# Patient Record
Sex: MALE | Race: WHITE | NOT HISPANIC OR LATINO | Employment: FULL TIME | URBAN - METROPOLITAN AREA
[De-identification: names, ages, dates, MRNs, and addresses within clinical notes are randomized per-mention and may not be internally consistent; named-entity substitution may affect disease eponyms.]

---

## 2024-10-16 ENCOUNTER — HOSPITAL ENCOUNTER (EMERGENCY)
Facility: HOSPITAL | Age: 54
Discharge: HOME | End: 2024-10-16
Attending: EMERGENCY MEDICINE
Payer: COMMERCIAL

## 2024-10-16 VITALS
HEIGHT: 70 IN | DIASTOLIC BLOOD PRESSURE: 83 MMHG | WEIGHT: 171.38 LBS | HEART RATE: 87 BPM | SYSTOLIC BLOOD PRESSURE: 134 MMHG | BODY MASS INDEX: 24.54 KG/M2 | OXYGEN SATURATION: 98 % | RESPIRATION RATE: 14 BRPM | TEMPERATURE: 98 F

## 2024-10-16 DIAGNOSIS — R07.89 ATYPICAL CHEST PAIN: ICD-10-CM

## 2024-10-16 DIAGNOSIS — R41.82 ALTERED MENTAL STATUS: ICD-10-CM

## 2024-10-16 DIAGNOSIS — R03.0 ELEVATED BLOOD PRESSURE READING: Primary | ICD-10-CM

## 2024-10-16 LAB
ALBUMIN SERPL BCP-MCNC: 4.4 G/DL (ref 3.5–5.2)
ALP SERPL-CCNC: 42 U/L (ref 55–135)
ALT SERPL W/O P-5'-P-CCNC: 27 U/L (ref 10–44)
AMPHET+METHAMPHET UR QL: NEGATIVE
ANION GAP SERPL CALC-SCNC: 11 MMOL/L (ref 8–16)
AST SERPL-CCNC: 28 U/L (ref 10–40)
BARBITURATES UR QL SCN>200 NG/ML: NEGATIVE
BASOPHILS # BLD AUTO: 0.04 K/UL (ref 0–0.2)
BASOPHILS NFR BLD: 1 % (ref 0–1.9)
BENZODIAZ UR QL SCN>200 NG/ML: NEGATIVE
BILIRUB SERPL-MCNC: 1.4 MG/DL (ref 0.1–1)
BUN SERPL-MCNC: 8 MG/DL (ref 6–20)
BZE UR QL SCN: NEGATIVE
CALCIUM SERPL-MCNC: 9.8 MG/DL (ref 8.7–10.5)
CANNABINOIDS UR QL SCN: ABNORMAL
CHLORIDE SERPL-SCNC: 104 MMOL/L (ref 95–110)
CO2 SERPL-SCNC: 23 MMOL/L (ref 23–29)
CREAT SERPL-MCNC: 1.4 MG/DL (ref 0.5–1.4)
CREAT UR-MCNC: 281.3 MG/DL (ref 23–375)
DIFFERENTIAL METHOD BLD: ABNORMAL
EOSINOPHIL # BLD AUTO: 0.1 K/UL (ref 0–0.5)
EOSINOPHIL NFR BLD: 1.2 % (ref 0–8)
ERYTHROCYTE [DISTWIDTH] IN BLOOD BY AUTOMATED COUNT: 11.9 % (ref 11.5–14.5)
EST. GFR  (NO RACE VARIABLE): >60 ML/MIN/1.73 M^2
GLUCOSE SERPL-MCNC: 162 MG/DL (ref 70–110)
HCT VFR BLD AUTO: 48.4 % (ref 40–54)
HCV AB SERPL QL IA: NEGATIVE
HEP C VIRUS HOLD SPECIMEN: NORMAL
HGB BLD-MCNC: 16.6 G/DL (ref 14–18)
HIV 1+2 AB+HIV1 P24 AG SERPL QL IA: NEGATIVE
IMM GRANULOCYTES # BLD AUTO: 0.02 K/UL (ref 0–0.04)
IMM GRANULOCYTES NFR BLD AUTO: 0.5 % (ref 0–0.5)
LYMPHOCYTES # BLD AUTO: 0.7 K/UL (ref 1–4.8)
LYMPHOCYTES NFR BLD: 17.3 % (ref 18–48)
MAGNESIUM SERPL-MCNC: 2 MG/DL (ref 1.6–2.6)
MCH RBC QN AUTO: 31.7 PG (ref 27–31)
MCHC RBC AUTO-ENTMCNC: 34.3 G/DL (ref 32–36)
MCV RBC AUTO: 93 FL (ref 82–98)
METHADONE UR QL SCN>300 NG/ML: NEGATIVE
MONOCYTES # BLD AUTO: 0.2 K/UL (ref 0.3–1)
MONOCYTES NFR BLD: 5.7 % (ref 4–15)
NEUTROPHILS # BLD AUTO: 3 K/UL (ref 1.8–7.7)
NEUTROPHILS NFR BLD: 74.3 % (ref 38–73)
NRBC BLD-RTO: 0 /100 WBC
OPIATES UR QL SCN: NEGATIVE
PCP UR QL SCN>25 NG/ML: NEGATIVE
PLATELET # BLD AUTO: 224 K/UL (ref 150–450)
PMV BLD AUTO: 9.8 FL (ref 9.2–12.9)
POCT GLUCOSE: 165 MG/DL (ref 70–110)
POTASSIUM SERPL-SCNC: 4.9 MMOL/L (ref 3.5–5.1)
PROCALCITONIN SERPL IA-MCNC: 0.02 NG/ML
PROT SERPL-MCNC: 7.7 G/DL (ref 6–8.4)
RBC # BLD AUTO: 5.23 M/UL (ref 4.6–6.2)
SODIUM SERPL-SCNC: 138 MMOL/L (ref 136–145)
TOXICOLOGY INFORMATION: ABNORMAL
TROPONIN I SERPL DL<=0.01 NG/ML-MCNC: <0.006 NG/ML (ref 0–0.03)
TSH SERPL DL<=0.005 MIU/L-ACNC: 0.52 UIU/ML (ref 0.4–4)
WBC # BLD AUTO: 4.05 K/UL (ref 3.9–12.7)

## 2024-10-16 PROCEDURE — 86803 HEPATITIS C AB TEST: CPT | Performed by: EMERGENCY MEDICINE

## 2024-10-16 PROCEDURE — 84443 ASSAY THYROID STIM HORMONE: CPT | Performed by: EMERGENCY MEDICINE

## 2024-10-16 PROCEDURE — 84484 ASSAY OF TROPONIN QUANT: CPT | Performed by: EMERGENCY MEDICINE

## 2024-10-16 PROCEDURE — 83735 ASSAY OF MAGNESIUM: CPT | Performed by: EMERGENCY MEDICINE

## 2024-10-16 PROCEDURE — 93010 ELECTROCARDIOGRAM REPORT: CPT | Mod: ,,, | Performed by: INTERNAL MEDICINE

## 2024-10-16 PROCEDURE — 85025 COMPLETE CBC W/AUTO DIFF WBC: CPT | Performed by: EMERGENCY MEDICINE

## 2024-10-16 PROCEDURE — 84145 PROCALCITONIN (PCT): CPT | Performed by: EMERGENCY MEDICINE

## 2024-10-16 PROCEDURE — 87389 HIV-1 AG W/HIV-1&-2 AB AG IA: CPT | Performed by: EMERGENCY MEDICINE

## 2024-10-16 PROCEDURE — 80053 COMPREHEN METABOLIC PANEL: CPT | Performed by: EMERGENCY MEDICINE

## 2024-10-16 PROCEDURE — 99285 EMERGENCY DEPT VISIT HI MDM: CPT | Mod: 25

## 2024-10-16 PROCEDURE — 80307 DRUG TEST PRSMV CHEM ANLYZR: CPT | Performed by: EMERGENCY MEDICINE

## 2024-10-16 PROCEDURE — 25500020 PHARM REV CODE 255: Performed by: EMERGENCY MEDICINE

## 2024-10-16 PROCEDURE — 82962 GLUCOSE BLOOD TEST: CPT

## 2024-10-16 PROCEDURE — 93005 ELECTROCARDIOGRAM TRACING: CPT

## 2024-10-16 RX ADMIN — IOHEXOL 100 ML: 350 INJECTION, SOLUTION INTRAVENOUS at 02:10

## 2024-10-16 NOTE — ED PROVIDER NOTES
"Emergency Medicine Provider Note - 10/16/2024       SCRIBE NOTE: I, Tony Salazar, am scribing for, and in the presence of Margi Aponte DO, FACEP     History     Chief Complaint   Patient presents with    Fatigue     Patient presents to ED with confusion, patient states he is having what feels like electrical shocks in his head, weakness, lethargy. Patient also states his vision is blurred, facial tingling. Patient states he woke up with symptoms. LKW 0000. No facial droop noted.        Allergies:  Review of patient's allergies indicates:  No Known Allergies    History of Present Illness   HPI    10/16/2024, 11:44 AM  The history is provided by the patient and old chart.    Tarik Mary is a 54 y.o. male presenting to the ED for' fatigue' episode which onset at 1:00 AM this morning.  Patient has history traumatic brain injury (1994), orbital facial fracture.  No hx of seizure disorder.  Pt describes sensation of  a brief "electric shock" to head.  The electric shock last a 1 or 2 seconds.  It is not associated with any severe headache, loss consciousness, nausea/vomiting, neck pain, loss posture, bowel or bladder incontinence, numbness or weakness to 1 side, or slurred speech.  He reports feeling fatigued after the episode. Pt reports that these 'episodes' have occurred for a decade.  He has seen multiple specialists and has not found an explanation.  A day before he has one of these 'episodes' he has decreased appetite.  He can sometimes sense it is about to happen because he feels a generalized weakness of "loss of control". Today, he figured that maybe if he comes into the emergency department closer to onset of the episode, diagnostic testing might be able to find something.  States that the current episode occurred at at 1:00 a.m. this morning. It was not anything unusual or different compared to his baseline.  He did not consider it more severe or lasting longer.  Patient denies any " preceding chest pain, chest pressure, shortness of breath, difficulty breathing, palpitations, blood in stool, black tarry stool, severe back pain, or abdominal pain prior to these episodes.  Patient does note thing chest discomfort that happened earlier this week.  It is not associated with any nausea, vomiting, diaphoresis.  Did not radiate.  Was brief.  Not associated with any calf pain, calf swelling.  No known history of coronary artery disease.  Patient denies known hyperlipidemia or diabetes..  Patient does smoke marijuana, last time he smoked marijuana was a proximally 1 week prior to the episode.  Patient also does consume one 16 oz beer/ day at dinner.  Patient denies any tactile paresthesias, tremors, or other symptoms to suggest alcohol withdrawal. Old records : In  August 2014, he saw neurology at [facility] and had CT and EEG that were both reported as normal.  Patient saw Dr. Menon (neurology) on January 27, 2016.   He recommended MRI and EEG. There is no record of patient completing these tests.    Arrival mode: Private Vehicle     PCP: No, Primary Doctor     Past Medical History:  Traumatic brain injury    Past Surgical History:  Maxillofacial surgery     Family History:  No family history on file.    Social History:  Alcohol: one 16 oz beer per day with dinner.  Positive THC use      I have reviewed the Past Medical History, Past Surgical History, Family History and Social History as documented above.      Review of Systems   Review of Systems   Constitutional:  Positive for appetite change and fatigue. Negative for fever.   Eyes:  Positive for visual disturbance ((+) Blurred vision, (-) loss of vision).   Respiratory:  Negative for chest tightness.    Cardiovascular:  Negative for chest pain and palpitations.   Gastrointestinal:  Positive for nausea. Negative for abdominal pain and vomiting.   Genitourinary:         (-) incontinence (bladder or bowel)   Musculoskeletal:  Negative for  neck pain.   Neurological:  Positive for weakness (Generalized), light-headedness and numbness ((+) Perioral,NOT unilateral). Negative for dizziness, syncope, facial asymmetry, speech difficulty and headaches.   All other systems reviewed and are negative.         Physical Exam     Initial Vitals [10/16/24 1047]   BP Pulse Resp Temp SpO2   (!) 157/91 90 18 97.8 °F (36.6 °C) 99 %      MAP       --          Physical Exam    Nursing Notes and Vital Signs Reviewed.  Constitutional: Patient is in no acute distress. Well-developed and well-nourished.  Head: Atraumatic. Normocephalic.  Eyes: PERRL. EOM intact. Conjunctivae are not pale. No scleral icterus.  ENT: Mucous membranes are moist. Oropharynx is clear and symmetric.  TMs pearly gray bilaterally.    Neck: Supple. Full ROM. No lymphadenopathy.  No meningismus.  Cardiovascular: Regular rate. Regular rhythm. No murmurs, rubs, or gallops. Distal pulses are 2+ and symmetric.  Pulmonary/Chest: No respiratory distress. Clear to auscultation bilaterally. No wheezing or rales.  Abdominal: Soft and non-distended.  There is no tenderness.  No rebound, guarding, or rigidity. Good bowel sounds.  Genitourinary: N/A  Musculoskeletal: Moves all extremities. No obvious deformities. No edema. No calf tenderness.  Skin: Warm and dry.  Neurological:  Alert, awake, and appropriate.  GCS 15.  A&O x3.  Normal speech.  No acute focal neurological deficits are appreciated. Finger to nose intact. Negative protonator drift. Stroke scale 0.  Negative pronator drift.  Negative heel drop.  Ambulates without assistance.  No ataxia.  Psychiatric: Normal affect. Good eye contact. Appropriate in content.     ED Course   ED Procedures:  Procedures    ED Vital Signs:  Vitals:    10/16/24 1047 10/16/24 1230 10/16/24 1600 10/16/24 1620   BP: (!) 157/91 (!) 143/83 (!) 152/84    Pulse: 90 82 97    Resp: 18  11    Temp: 97.8 °F (36.6 °C) 98.1 °F (36.7 °C)  98.2 °F (36.8 °C)   TempSrc: Oral Oral  Oral  "  SpO2: 99% 97% 97%    Weight: 77.7 kg (171 lb 6.4 oz)      Height: 5' 10" (1.778 m)       10/16/24 1700   BP: 134/83   Pulse: 87   Resp: 14   Temp:    TempSrc:    SpO2: 98%   Weight:    Height:        All Lab Results:  Results for orders placed or performed during the hospital encounter of 10/16/24   POCT glucose    Collection Time: 10/16/24 10:54 AM   Result Value Ref Range    POCT Glucose 165 (H) 70 - 110 mg/dL   HIV 1/2 Ag/Ab (4th Gen)    Collection Time: 10/16/24 11:27 AM   Result Value Ref Range    HIV 1/2 Ag/Ab Negative Negative   Hepatitis C Antibody    Collection Time: 10/16/24 11:27 AM   Result Value Ref Range    Hepatitis C Ab Negative Negative   HCV Virus Hold Specimen    Collection Time: 10/16/24 11:27 AM   Result Value Ref Range    HEP C Virus Hold Specimen Hold for HCV sendout    CBC auto differential    Collection Time: 10/16/24 11:27 AM   Result Value Ref Range    WBC 4.05 3.90 - 12.70 K/uL    RBC 5.23 4.60 - 6.20 M/uL    Hemoglobin 16.6 14.0 - 18.0 g/dL    Hematocrit 48.4 40.0 - 54.0 %    MCV 93 82 - 98 fL    MCH 31.7 (H) 27.0 - 31.0 pg    MCHC 34.3 32.0 - 36.0 g/dL    RDW 11.9 11.5 - 14.5 %    Platelets 224 150 - 450 K/uL    MPV 9.8 9.2 - 12.9 fL    Immature Granulocytes 0.5 0.0 - 0.5 %    Gran # (ANC) 3.0 1.8 - 7.7 K/uL    Immature Grans (Abs) 0.02 0.00 - 0.04 K/uL    Lymph # 0.7 (L) 1.0 - 4.8 K/uL    Mono # 0.2 (L) 0.3 - 1.0 K/uL    Eos # 0.1 0.0 - 0.5 K/uL    Baso # 0.04 0.00 - 0.20 K/uL    nRBC 0 0 /100 WBC    Gran % 74.3 (H) 38.0 - 73.0 %    Lymph % 17.3 (L) 18.0 - 48.0 %    Mono % 5.7 4.0 - 15.0 %    Eosinophil % 1.2 0.0 - 8.0 %    Basophil % 1.0 0.0 - 1.9 %    Differential Method Automated    Comprehensive metabolic panel    Collection Time: 10/16/24 11:27 AM   Result Value Ref Range    Sodium 138 136 - 145 mmol/L    Potassium 4.9 3.5 - 5.1 mmol/L    Chloride 104 95 - 110 mmol/L    CO2 23 23 - 29 mmol/L    Glucose 162 (H) 70 - 110 mg/dL    BUN 8 6 - 20 mg/dL    Creatinine 1.4 0.5 - 1.4 " mg/dL    Calcium 9.8 8.7 - 10.5 mg/dL    Total Protein 7.7 6.0 - 8.4 g/dL    Albumin 4.4 3.5 - 5.2 g/dL    Total Bilirubin 1.4 (H) 0.1 - 1.0 mg/dL    Alkaline Phosphatase 42 (L) 55 - 135 U/L    AST 28 10 - 40 U/L    ALT 27 10 - 44 U/L    eGFR >60 >60 mL/min/1.73 m^2    Anion Gap 11 8 - 16 mmol/L   Magnesium    Collection Time: 10/16/24 11:27 AM   Result Value Ref Range    Magnesium 2.0 1.6 - 2.6 mg/dL   TSH    Collection Time: 10/16/24 11:27 AM   Result Value Ref Range    TSH 0.515 0.400 - 4.000 uIU/mL   Procalcitonin    Collection Time: 10/16/24 11:27 AM   Result Value Ref Range    Procalcitonin 0.02 <0.25 ng/mL   Troponin I    Collection Time: 10/16/24 11:27 AM   Result Value Ref Range    Troponin I <0.006 0.000 - 0.026 ng/mL   Drug screen panel, emergency    Collection Time: 10/16/24 12:15 PM   Result Value Ref Range    Benzodiazepines Negative Negative    Methadone metabolites Negative Negative    Cocaine (Metab.) Negative Negative    Opiate Scrn, Ur Negative Negative    Barbiturate Screen, Ur Negative Negative    Amphetamine Screen, Ur Negative Negative    THC Presumptive Positive (A) Negative    Phencyclidine Negative Negative    Creatinine, Urine 281.3 23.0 - 375.0 mg/dL    Toxicology Information SEE COMMENT    EKG 12-lead    Collection Time: 10/16/24  1:40 PM   Result Value Ref Range    QRS Duration 78 ms    [facility] QTC Calculation 414 ms             ECG Results              EKG 12-lead (Preliminary result)        Collection Time Result Time QRS Duration [facility] QTC Calculation    10/16/24 13:40:32 10/16/24 16:38:26 78 414                     Wet Read by Margi Aponte DO (10/16/24 16:38:38, [facility] - Emergency Dept., Emergency Medicine)    Normal sinus rhythm.  Normal axis.  No ST segment elevation.  No STEMI.  No prolonged QT.  .                        In process by Interface, Lab In [facility] (10/16/24 13:54:16)                   Narrative:    Test Reason : R07.89,    Vent. Rate : 085 BPM      Atrial Rate : 085 BPM     P-R Int : 132 ms          QRS Dur : 078 ms      QT Int : 348 ms       P-R-T Axes : 052 048 039 degrees     QTc Int : 414 ms    Normal sinus rhythm  Normal ECG  No previous ECGs available    Referred By: [email or web address]   SELF           Confirmed By:                                     Imaging Results:  Imaging Results              CTA Head and Neck (xpd) (Final result)  Result time 10/16/24 15:14:36      Final result by Maxim Wilson MD (10/16/24 15:14:36)                   Impression:      No intracranial large vessel occlusion, hemodynamically significant stenosis or aneurysm.    The cervical carotid and vertebral arteries are patent without hemodynamically significant stenosis.    All CT scans at this facility use dose modulation, iterative reconstruction, and/or weight base dosing when appropriate to reduce radiation dose to as low as reasonably achievable.      Electronically signed by: Maxim Wilson  Date:    10/16/2024  Time:    15:14               Narrative:    EXAMINATION:  CTA HEAD AND NECK (XPD)    CLINICAL HISTORY:  Dizziness, persistent/recurrent, cardiac or vascular cause suspected;    TECHNIQUE:  Standard CTA of the intracranial and extracranial circulation was performed after the administration of intravenous contrast in the arterial phase. This examination was interpreted using multiplanar and 3D reconstructions.    COMPARISON:  Head CT same date    FINDINGS:  Extracranial:    Left-sided, 2 vessel aortic arch.  No evidence of stenosis of the origins of the great vessels from the arch. The subclavian arteries are without hemodynamically significant stenosis.    The right common carotid is without significant stenosis. The extracranial right internal carotid artery is without significant stenosis. No evidence of aneurysm or dissection.    The left common carotid is without significant stenosis. The extracranial left internal carotid artery is without significant stenosis. No  evidence of aneurysm or dissection.    Extracranial vertebral arteries: The origins of both vertebral arteries are patent.  Normal course and caliber the bilateral vertebral arteries without hemodynamically significant stenosis, aneurysm or evidence of dissection.    Salivary glands appear within normal limits.    The thyroid gland appears within normal limits.    No pathologic cervical lymphadenopathy.    The visualized lung apices are clear.    Mild degenerative changes of the osseous structures.  No acute or concerning osseous abnormalities.    Intracranial:    Intracranial ICAs are patent without hemodynamically significant stenosis.  No aneurysm.  Normal course of the intracranial ICAs.    The right M1 segment is patent without hemodynamically significant stenosis.  The right M2 and proximal M3 branch vessels are patent.    The left M1 segment is patent without hemodynamically significant stenosis or aneurysm.  The left M2 and proximal M3 branch vessels are patent.    The bilateral A1 segments are present and patent.  There appears to be a patent anterior communicating artery.  No aneurysm.  The A2 and proximal A3 branch vessels are patent.    The intracranial vertebral arteries are patent to the basilar confluence.  The basilar artery is patent without hemodynamically significant stenosis or aneurysm.    Posterior communicating arteries are absent or hypoplastic.  The bilateral P1, P2 and proximal P3 branch vessels are patent.    Origins of the superior cerebellar and posteroinferior cerebral arteries are within normal limits.    The major dural venous sinuses are patent.                                       X-Ray Chest AP Portable (Final result)  Result time 10/16/24 13:29:35      Final result by Julian Rosa MD (Timothy) (10/16/24 13:29:35)                   Impression:      Negative single view chest x-ray.      Electronically signed by: Julian Rosa MD  Date:    10/16/2024  Time:    13:29                Narrative:    EXAMINATION:  XR CHEST AP PORTABLE    CLINICAL HISTORY:  Atypical chest pain,    COMPARISON:  None    FINDINGS:  Heart size is normal. The lung fields are clear. No acute cardiopulmonary infiltrate.                                       CT Head Without Contrast (Final result)  Result time 10/16/24 12:17:53      Final result by Julian Rosa MD (Timothy) (10/16/24 12:17:53)                   Impression:      No acute intracranial abnormality.    All CT scans at this facility use dose modulation, iterative reconstructions, and/or weight base dosing when appropriate to reduce radiation dose to as low as reasonably achievable.      Electronically signed by: Julian Rosa MD  Date:    10/16/2024  Time:    12:17               Narrative:    EXAMINATION:  CT HEAD WITHOUT CONTRAST    CLINICAL HISTORY:  Altered mental status, unspecifiedSyncope, recurrent;    TECHNIQUE:  Noncontrast images.    COMPARISON:  None    FINDINGS:  No intracranial acute hemorrhage or acute focal brain parenchymal abnormality is identified.  Calvarium is intact.    Mucosal thickening of the ethmoid sinuses.  Previous surgery related to the orbit and face.                                     The EKG was ordered, reviewed, and independently interpreted by the ED provider.  Interpretation time: 13:40  Rate: 85 BPM  Rhythm: normal sinus rhythm  Interpretation: No ST changes detected. No STEMI.           The Emergency Provider reviewed the vital signs and test results, which are outlined above.     ED Discussion   ED Medication(s):  Medications   iohexoL (OMNIPAQUE 350) injection 100 mL (100 mLs Intravenous Given 10/16/24 1441)       ED Course as of 10/17/24 0814   Wed Oct 16, 2024   1646 Since patient is awake alert oriented.  Ambulates without difficulty.  GCS 15.  No focal deficits.  Discussed lack of specific diagnosis.  Recommend he follow up.  Patient had advised to avoid marijuana. [LB]      ED Course User Index  [LB]  Margi Aponte, DO            16:46 Reassessment: Dr. Aponte reassessed the pt.  The pt is resting comfortably and is NAD.  Pt states their sx have improved. Discussed test results, shared treatment plan, specific conditions for return, and the need for f/u.  Answered their questions at this time.  Pt understands and agrees to the plan.  The pt has remained hemodynamically stable through ED course and is stable for discharge.    I discussed with patient and/or family/caretaker that evaluation in the ED does not suggest any emergent or life threatening medical conditions requiring immediate intervention beyond what was provided in the ED, and I believe patient is safe for discharge.  Regardless, an unremarkable evaluation in the ED does not preclude the development or presence of a serious of life threatening condition. As such, patient was instructed to return immediately for any worsening or change in current symptoms.    Regarding CHEST PAIN, I advised the patient that chest pain can be caused by a range of conditions, from not serious to life-threatening such as: heart attack or a blood clot in your lungs, angina indicating poor blood flow to the heart; infection, inflammation, or a fracture in the bones or cartilage in chest wall; a digestive problem, such as acid reflux or a stomach ulcer; or even an anxiety attack.  Instructed patient to follow up with primary healthcare provider for reevaluation and possible diagnostic studies to find the actual cause of the chest pain. Patient was instructed to call 911 or go to the nearest emergency department if they develop any of the following signs of a heart attack: squeezing, pressure, or pain in the chest that lasts longer than five minutes or returns; discomfort or pain in the back, neck, jaw, stomach, or arm; trouble breathing; nausea or vomiting; lightheadedness;  or a sudden cold sweat, especially with chest pain or trouble breathing.  Also return to the  emergency department the chest discomfort gets worse (even with medicine); cough or vomit blood; have bowel movements that are black or bloody; cannot stop vomiting; or develop difficulty swallowing.    I have not determined a specific etiology for patient's symptoms based on evaluation in the ED, but I have low suspicion for medical, surgical or other life threatening illness and I believe patient is safe for discharge.  With lack of etiology for the patient's complaints, I specifically counseled the patient and/or family/caretaker that despite an unrevealing evaluation in the ED, patient may still be at risk for serious, even life threatening illness.  As such, patient was instructed to return immediately for any worsening or change in current symptoms, or for any concern.    I have discussed with patient and/or family/caretaker chest pain precautions, specifically to return for worsening chest pain, shortness of breath, fever, or any concern.  I have low suspicion for cardiopulmonary, vascular, infectious, respiratory, or other emergent medical condition based on my evaluation in the ED.    I have low suspicion for cardiopulmonary, vascular, infectious, respiratory, or other emergent medical condition relating to patients chest pain, and based on my evaluation, patient is a low risk chest pain patient and I have scheduled for an outpatient stress test.  I have specifically counseled the patient and/or family/caretaker that a negative chest pain evaluation in the ED did not demonstrate evidence of recent heart attack.  However, we have not evaluated risk for an impending heart attack or other serious, life threatening condition - this is the purpose of the stress test.  As such, I discussed with the patient that they must return to the ED immediately should they experience any recurrence of their chest pain,  shortness of breath, or symptoms for which they were evaluated in the ED, especially if they occur prior  to the stress test.       MIPS Measures     Smoker? No     Hypertension: Blood pressure was > 120/80.  Patient was informed that they may be developing hypertension.  They were advised to keep a log of their blood pressure and follow up with their primary care physician.         Medical Decision Making           Additional MDM:     NIH Stroke Scale:   Interval = baseline (upon arrival/admit)  Level of consciousness = 0 - alert  LOC questions = 0 - answers both correctly  LOC commands = 0 - performs both correctly  Best gaze = 0 - normal  Visual = 0 - no visual loss  Facial palsy = 0 - normal  Motor left arm =  0 - no drift  Motor right arm =  0 - no drift  Motor left leg = 0 - no drift  Motor right leg =  0 - no drift  Limb ataxia = 0 - absent  Sensory = 0 - normal  Best language = 0 - no aphasia  Dysarthria = 0 - normal articulation  Extinction and inattention = 0 - no neglect  NIH Stroke Scale Total = 0           Medical Decision Making  Differential diagnosis: Hypoglycemia, anxiety attack, absence seizure, intoxicant, hypothyroidism,    ED course: Triage note documents lethargy.  Patient was very much awake alert oriented.  GCS 15.  No focal deficits on exam.  NIH Stroke Scale equals 0.  Patient is able to ambulate in the emergency room without ataxia.  Patient is afebrile.  UDS positive for THC consistent with history.  HIV negative.  Hepatitis-C negative.  WBC 4.05.  H/H normal.  Mild elevation total bilirubin on CMP, otherwise normal.  Anion gap 11.  Mag 2.0.  K +4.9.  TSH 0.515.  Procalcitonin 0.02.  Troponin less than 0.006.  EKG notes shows no STEMI.  Patient had chest pain earlier this week.  One troponin sufficient to exclude coronary artery disease.  Chest x-ray no acute abnormality.  CT head: No acute abnormality.  CTA head and neck:  No stenosis, aneurysm, or dissection.    Patient's old records were reviewed.  Patient has been having these episodes since 2014.  The episodes do not appear to be  "accelerated nor do the episodes appear to be different from baseline.  Patient was neurologically intact.  No focal deficits.  GCS 15.  Patient able ambulate.  Clinically not suspicious of meningitis.  Patient advised to abstain from THC.  Outpatient referrals for Cardiology and Neurology.    Amount and/or Complexity of Data Reviewed  External Data Reviewed: notes.     Details: See HPI.  Labs: ordered. Decision-making details documented in ED Course.  Radiology: ordered and independent interpretation performed. Decision-making details documented in ED Course.  ECG/medicine tests: ordered and independent interpretation performed. Decision-making details documented in ED Course.        Prescription Management: I performed a review of the patient's current Rx medication list as input by nursing staff.    There are no discharge medications for this patient.       Discussed case verbally with:    Referrals:  Orders Placed This Encounter   Procedures    Ambulatory referral/consult to Cardiology     Standing Status:   Future     Standing Expiration Date:   11/16/2025     Referral Priority:   Routine     Referral Type:   Consultation     Referral Reason:   Specialty Services Required     Requested Specialty:   Cardiology     Number of Visits Requested:   1    Ambulatory referral/consult to Neurology     Standing Status:   Future     Standing Expiration Date:   11/16/2025     Referral Priority:   Routine     Referral Type:   Consultation     Referral Reason:   Specialty Services Required     Requested Specialty:   Neurology     Number of Visits Requested:   1         Portions of this note may have been created with voice recognition software. Occasional "wrong-word" or "sound-a-like" substitutions may have occurred due to the inherent limitations of voice recognition software. Please, read the note carefully and recognize, using context, where substitutions have occurred.          Clinical Impression       ICD-10-CM ICD-9-CM "   1. Elevated blood pressure reading  R03.0 796.2   2. Altered mental status  R41.82 780.97   3. Atypical chest pain  R07.89 786.59         ED Disposition  Disposition:   Disposition: Discharged  Condition: Stable    ED Follow-up   Follow-up Information       [facility] - Cardiology In 2 days.    Specialty: Cardiology  Why: Return to emergency department for chest pain, chest pressure, shortness of breath, severe headache, numbness or weakness to 1 side, or other concerns  Contact information:  [address] [place] [address] Dr Jeremy Christy [place] [id number] [phone]  Additional information:  Please take [address] 1 to the Medical Office Building. Check in on the 4th floor.             [facility] In 2 days.    Specialty: Neurology  Why: For follow up.  Return to emergency department for chest pain, chest pressure, shortness of breath, difficulty breathing, numbness or weakness to 1 side, slurred speech  Contact information:  [address] [place] [address] Dr Jeremy Christy [place] [id number] [phone]  Additional information:  Please take [address] for the Medical Office Building. Check in on the 2nd floor.                             Scribe Attestation:   Scribe #1: I, Tony Salazar,  performed the above scribed service and the documentation accurately describes the services I performed. I attest to the accuracy of the note.     Attending:   Physician Attestation Statement for Scribe #1: I, Margi Aponte DO, FACEP, personally performed the services described in this documentation, as scribed by Tony Salazar , in my presence, and it is both accurate and complete.                   Margi Aponte DO  10/17/24 0821

## 2024-10-17 LAB
OHS QRS DURATION: 78 MS
OHS QTC CALCULATION: 414 MS

## 2024-10-22 ENCOUNTER — OFFICE VISIT (OUTPATIENT)
Dept: NEUROLOGY | Facility: CLINIC | Age: 54
End: 2024-10-22
Payer: COMMERCIAL

## 2024-10-22 ENCOUNTER — LAB VISIT (OUTPATIENT)
Dept: LAB | Facility: HOSPITAL | Age: 54
End: 2024-10-22
Attending: NURSE PRACTITIONER
Payer: COMMERCIAL

## 2024-10-22 VITALS
RESPIRATION RATE: 16 BRPM | DIASTOLIC BLOOD PRESSURE: 90 MMHG | WEIGHT: 171.06 LBS | HEIGHT: 70 IN | BODY MASS INDEX: 24.49 KG/M2 | HEART RATE: 75 BPM | SYSTOLIC BLOOD PRESSURE: 149 MMHG

## 2024-10-22 DIAGNOSIS — F32.A DEPRESSION, UNSPECIFIED DEPRESSION TYPE: ICD-10-CM

## 2024-10-22 DIAGNOSIS — R41.9 COGNITIVE COMPLAINTS WITH NORMAL EXAM: Primary | ICD-10-CM

## 2024-10-22 DIAGNOSIS — F41.1 GAD (GENERALIZED ANXIETY DISORDER): ICD-10-CM

## 2024-10-22 DIAGNOSIS — E53.8 VITAMIN B12 DEFICIENCY: ICD-10-CM

## 2024-10-22 DIAGNOSIS — Z78.9 DAILY CONSUMPTION OF ALCOHOL: ICD-10-CM

## 2024-10-22 DIAGNOSIS — R40.4 TRANSIENT ALTERATION OF AWARENESS: ICD-10-CM

## 2024-10-22 DIAGNOSIS — R41.3 OTHER AMNESIA: ICD-10-CM

## 2024-10-22 DIAGNOSIS — R41.9 COGNITIVE COMPLAINTS WITH NORMAL EXAM: ICD-10-CM

## 2024-10-22 DIAGNOSIS — R41.82 ALTERED MENTAL STATUS: ICD-10-CM

## 2024-10-22 PROCEDURE — 83090 ASSAY OF HOMOCYSTEINE: CPT | Performed by: NURSE PRACTITIONER

## 2024-10-22 PROCEDURE — 86038 ANTINUCLEAR ANTIBODIES: CPT | Performed by: NURSE PRACTITIONER

## 2024-10-22 PROCEDURE — 99999 PR PBB SHADOW E&M-EST. PATIENT-LVL IV: CPT | Mod: PBBFAC,,, | Performed by: NURSE PRACTITIONER

## 2024-10-22 PROCEDURE — 86593 SYPHILIS TEST NON-TREP QUANT: CPT | Performed by: NURSE PRACTITIONER

## 2024-10-22 PROCEDURE — 82746 ASSAY OF FOLIC ACID SERUM: CPT | Performed by: NURSE PRACTITIONER

## 2024-10-22 PROCEDURE — 87389 HIV-1 AG W/HIV-1&-2 AB AG IA: CPT | Performed by: NURSE PRACTITIONER

## 2024-10-22 PROCEDURE — 82607 VITAMIN B-12: CPT | Performed by: NURSE PRACTITIONER

## 2024-10-22 PROCEDURE — 36415 COLL VENOUS BLD VENIPUNCTURE: CPT | Performed by: NURSE PRACTITIONER
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
## 2024-10-23 ENCOUNTER — OFFICE VISIT (OUTPATIENT)
Dept: CARDIOLOGY | Facility: CLINIC | Age: 54
End: 2024-10-23
Payer: COMMERCIAL

## 2024-10-23 VITALS
SYSTOLIC BLOOD PRESSURE: 142 MMHG | OXYGEN SATURATION: 97 % | BODY MASS INDEX: 24.61 KG/M2 | HEIGHT: 70 IN | DIASTOLIC BLOOD PRESSURE: 96 MMHG | WEIGHT: 171.88 LBS | HEART RATE: 53 BPM

## 2024-10-23 DIAGNOSIS — R41.9 COGNITIVE COMPLAINTS WITH NORMAL EXAM: ICD-10-CM

## 2024-10-23 DIAGNOSIS — R07.89 ATYPICAL CHEST PAIN: Primary | ICD-10-CM

## 2024-10-23 DIAGNOSIS — I10 PRIMARY HYPERTENSION: ICD-10-CM

## 2024-10-23 DIAGNOSIS — I51.7 LVH (LEFT VENTRICULAR HYPERTROPHY): ICD-10-CM

## 2024-10-23 LAB
ANA SER QL IF: NORMAL
FOLATE SERPL-MCNC: 5.7 NG/ML (ref 4–24)
HCYS SERPL-SCNC: 13.7 UMOL/L (ref 4–16.5)
HIV 1+2 AB+HIV1 P24 AG SERPL QL IA: NORMAL
TREPONEMA PALLIDUM IGG+IGM AB [PRESENCE] IN SERUM OR PLASMA BY IMMUNOASSAY: NONREACTIVE
VIT B12 SERPL-MCNC: 204 PG/ML (ref 210–950)

## 2024-10-23 PROCEDURE — 1160F RVW MEDS BY RX/DR IN RCRD: CPT | Mod: CPTII,S$GLB,, | Performed by: INTERNAL MEDICINE

## 2024-10-23 PROCEDURE — 99999 PR PBB SHADOW E&M-EST. PATIENT-LVL IV: CPT | Mod: PBBFAC,,, | Performed by: INTERNAL MEDICINE

## 2024-10-23 PROCEDURE — 99204 OFFICE O/P NEW MOD 45 MIN: CPT | Mod: S$GLB,,, | Performed by: INTERNAL MEDICINE

## 2024-10-23 PROCEDURE — G2211 COMPLEX E/M VISIT ADD ON: HCPCS | Mod: S$GLB,,, | Performed by: INTERNAL MEDICINE

## 2024-10-23 PROCEDURE — 1159F MED LIST DOCD IN RCRD: CPT | Mod: CPTII,S$GLB,, | Performed by: INTERNAL MEDICINE

## 2024-10-23 PROCEDURE — 3077F SYST BP >= 140 MM HG: CPT | Mod: CPTII,S$GLB,, | Performed by: INTERNAL MEDICINE

## 2024-10-23 PROCEDURE — 3080F DIAST BP >= 90 MM HG: CPT | Mod: CPTII,S$GLB,, | Performed by: INTERNAL MEDICINE

## 2024-10-23 PROCEDURE — 3008F BODY MASS INDEX DOCD: CPT | Mod: CPTII,S$GLB,, | Performed by: INTERNAL MEDICINE

## 2024-10-23 RX ORDER — AMLODIPINE BESYLATE 2.5 MG/1
2.5 TABLET ORAL DAILY
Qty: 90 TABLET | Refills: 3 | Status: SHIPPED | OUTPATIENT
Start: 2024-10-23 | End: 2025-10-23
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
## 2024-10-24 ENCOUNTER — TELEPHONE (OUTPATIENT)
Dept: NEUROLOGY | Facility: CLINIC | Age: 54
End: 2024-10-24
Payer: COMMERCIAL

## 2024-10-24 RX ORDER — CYANOCOBALAMIN 1000 UG/ML
INJECTION, SOLUTION INTRAMUSCULAR; SUBCUTANEOUS
Qty: 12 ML | Refills: 0 | Status: SHIPPED | OUTPATIENT
Start: 2024-10-24 | End: 2025-11-23

## 2024-10-24 NOTE — TELEPHONE ENCOUNTER
----- Message from Gayle Salazar NP sent at 10/24/2024  7:33 AM CDT -----  LABORATORY EVALUATION:    10-    VITAMIN B 12 204 L, ALESSIO -VE, FA LEVEL 5.7 NL, HC LEVEL 13.7 NL, RPR -VE,     LABS WNL, except low B 12 recommend Vitamin B 12 replacement injections weekly for 4 weeks then monthly thereafter. Goal level greater than 450 for optimal neurological health.

## 2024-10-24 NOTE — PROGRESS NOTES
LABORATORY EVALUATION:    10-    VITAMIN B 12 204 L, ALESSIO -VE, FA LEVEL 5.7 NL, HC LEVEL 13.7 NL, RPR -VE,     LABS WNL, except low B 12 recommend Vitamin B 12 replacement injections weekly for 4 weeks then monthly thereafter. Goal level greater than 450 for optimal neurological health.

## 2024-10-24 NOTE — TELEPHONE ENCOUNTER
----- Message from Enedelia sent at 10/24/2024  8:53 AM CDT -----  .Type:  Patient Returning Call    Who Called:.Tarik Mary   Who Left Message for Patient:  Does the patient know what this is regarding?:test results  Would the patient rather a call back or a response via [facility]? Call back  Best Call Back Number:.[phone]   Additional Information:

## 2024-10-29 ENCOUNTER — HOSPITAL ENCOUNTER (OUTPATIENT)
Dept: CARDIOLOGY | Facility: HOSPITAL | Age: 54
Discharge: HOME | End: 2024-10-29
Attending: INTERNAL MEDICINE
Payer: COMMERCIAL

## 2024-10-29 VITALS
WEIGHT: 171 LBS | HEIGHT: 70 IN | DIASTOLIC BLOOD PRESSURE: 96 MMHG | SYSTOLIC BLOOD PRESSURE: 142 MMHG | BODY MASS INDEX: 24.48 KG/M2

## 2024-10-29 DIAGNOSIS — I51.7 LVH (LEFT VENTRICULAR HYPERTROPHY): ICD-10-CM

## 2024-10-29 DIAGNOSIS — I10 PRIMARY HYPERTENSION: ICD-10-CM

## 2024-10-29 LAB
AORTIC ROOT ANNULUS: 2.8 CM
ASCENDING AORTA: 3.12 CM
AV INDEX (PROSTH): 0.89
AV MEAN GRADIENT: 3.2 MMHG
AV PEAK GRADIENT: 6.8 MMHG
AV VALVE AREA BY VELOCITY RATIO: 2.4 CM²
AV VALVE AREA: 2.8 CM²
AV VELOCITY RATIO: 0.77
BSA FOR ECHO PROCEDURE: 1.96 M2
CV ECHO LV RWT: 0.4 CM
DOP CALC AO PEAK VEL: 1.3 M/S
DOP CALC AO VTI: 24.3 CM
DOP CALC LVOT AREA: 3.1 CM2
DOP CALC LVOT DIAMETER: 2 CM
DOP CALC LVOT PEAK VEL: 1 M/S
DOP CALC LVOT STROKE VOLUME: 68.1 CM3
DOP CALC RVOT PEAK VEL: 0.79 M/S
DOP CALC RVOT VTI: 15.4 CM
DOP CALCLVOT PEAK VEL VTI: 21.7 CM
E WAVE DECELERATION TIME: 285.72 MSEC
E/A RATIO: 0.95
E/E' RATIO: 6.86 M/S
ECHO LV POSTERIOR WALL: 0.9 CM (ref 0.6–1.1)
FRACTIONAL SHORTENING: 33.3 % (ref 28–44)
INTERVENTRICULAR SEPTUM: 0.9 CM (ref 0.6–1.1)
IVC DIAMETER: 1.76 CM
IVRT: 91.34 MSEC
LA MAJOR: 4.76 CM
LA MINOR: 4.44 CM
LA WIDTH: 3.5 CM
LEFT ATRIUM AREA SYSTOLIC (APICAL 2 CHAMBER): 15.12 CM2
LEFT ATRIUM AREA SYSTOLIC (APICAL 4 CHAMBER): 15 CM2
LEFT ATRIUM SIZE: 3.57 CM
LEFT ATRIUM VOLUME INDEX MOD: 19.9 ML/M2
LEFT ATRIUM VOLUME INDEX: 25 ML/M2
LEFT ATRIUM VOLUME MOD: 38.74 ML
LEFT ATRIUM VOLUME: 48.8 CM3
LEFT INTERNAL DIMENSION IN SYSTOLE: 3 CM (ref 2.1–4)
LEFT VENTRICLE DIASTOLIC VOLUME INDEX: 47.4 ML/M2
LEFT VENTRICLE DIASTOLIC VOLUME: 92.43 ML
LEFT VENTRICLE END SYSTOLIC VOLUME APICAL 2 CHAMBER: 39.37 ML
LEFT VENTRICLE END SYSTOLIC VOLUME APICAL 4 CHAMBER: 38.16 ML
LEFT VENTRICLE MASS INDEX: 68.1 G/M2
LEFT VENTRICLE SYSTOLIC VOLUME INDEX: 18.4 ML/M2
LEFT VENTRICLE SYSTOLIC VOLUME: 35.82 ML
LEFT VENTRICULAR INTERNAL DIMENSION IN DIASTOLE: 4.5 CM (ref 3.5–6)
LEFT VENTRICULAR MASS: 132.8 G
LV LATERAL E/E' RATIO: 5.54 M/S
LV SEPTAL E/E' RATIO: 9 M/S
LVED V (TEICH): 92.43 ML
LVES V (TEICH): 35.82 ML
LVOT MG: 2.05 MMHG
LVOT MV: 0.65 CM/S
MV PEAK A VEL: 0.76 M/S
MV PEAK E VEL: 0.72 M/S
MV STENOSIS PRESSURE HALF TIME: 82.86 MS
MV VALVE AREA P 1/2 METHOD: 2.66 CM2
PISA TR MAX VEL: 2.7 M/S
PV MEAN GRADIENT: 1 MMHG
PV PEAK GRADIENT: 3 MMHG
PV PEAK VELOCITY: 0.89 M/S
RA MAJOR: 4.65 CM
RA PRESSURE ESTIMATED: 3 MMHG
RA WIDTH: 3.58 CM
RIGHT VENTRICULAR END-DIASTOLIC DIMENSION: 3.02 CM
RV TB RVSP: 6 MMHG
SINUS: 3.28 CM
STJ: 2.92 CM
TDI LATERAL: 0.13 M/S
TDI SEPTAL: 0.08 M/S
TDI: 0.11 M/S
TR MAX PG: 29 MMHG
TRICUSPID ANNULAR PLANE SYSTOLIC EXCURSION: 2.05 CM
TV REST PULMONARY ARTERY PRESSURE: 32 MMHG
Z-SCORE OF LEFT VENTRICULAR DIMENSION IN END DIASTOLE: -2.11
Z-SCORE OF LEFT VENTRICULAR DIMENSION IN END SYSTOLE: -1.04

## 2024-10-29 PROCEDURE — 93306 TTE W/DOPPLER COMPLETE: CPT | Mod: 26,,, | Performed by: INTERNAL MEDICINE

## 2024-10-29 PROCEDURE — 93306 TTE W/DOPPLER COMPLETE: CPT

## 2024-10-30 ENCOUNTER — TELEPHONE (OUTPATIENT)
Dept: CARDIOLOGY | Facility: CLINIC | Age: 54
End: 2024-10-30
Payer: COMMERCIAL
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
## 2024-11-11 ENCOUNTER — OFFICE VISIT (OUTPATIENT)
Dept: PSYCHIATRY | Facility: CLINIC | Age: 54
End: 2024-11-11
Payer: COMMERCIAL

## 2024-11-11 VITALS — DIASTOLIC BLOOD PRESSURE: 93 MMHG | SYSTOLIC BLOOD PRESSURE: 157 MMHG | HEART RATE: 66 BPM

## 2024-11-11 DIAGNOSIS — F90.2 ATTENTION DEFICIT HYPERACTIVITY DISORDER (ADHD), COMBINED TYPE: ICD-10-CM

## 2024-11-11 DIAGNOSIS — F32.A DEPRESSION, UNSPECIFIED DEPRESSION TYPE: ICD-10-CM

## 2024-11-11 DIAGNOSIS — F41.1 GAD (GENERALIZED ANXIETY DISORDER): ICD-10-CM

## 2024-11-11 DIAGNOSIS — R41.9 COGNITIVE COMPLAINTS WITH NORMAL EXAM: ICD-10-CM

## 2024-11-11 DIAGNOSIS — F31.0: Primary | ICD-10-CM

## 2024-11-11 DIAGNOSIS — R41.82 ALTERED MENTAL STATUS, UNSPECIFIED ALTERED MENTAL STATUS TYPE: ICD-10-CM

## 2024-11-11 PROCEDURE — 99999 PR PBB SHADOW E&M-EST. PATIENT-LVL III: CPT | Mod: PBBFAC,,, | Performed by: PSYCHOLOGIST

## 2024-11-11 RX ORDER — ARIPIPRAZOLE 2 MG/1
2 TABLET ORAL DAILY
Qty: 30 TABLET | Refills: 2 | Status: SHIPPED | OUTPATIENT
Start: 2024-11-11 | End: 2025-02-09

## 2024-11-13 ENCOUNTER — HOSPITAL ENCOUNTER (OUTPATIENT)
Dept: RADIOLOGY | Facility: HOSPITAL | Age: 54
Discharge: HOME | End: 2024-11-13
Attending: NURSE PRACTITIONER
Payer: COMMERCIAL

## 2024-11-13 ENCOUNTER — HOSPITAL ENCOUNTER (OUTPATIENT)
Dept: PULMONOLOGY | Facility: HOSPITAL | Age: 54
Discharge: HOME | End: 2024-11-13
Attending: NURSE PRACTITIONER
Payer: COMMERCIAL

## 2024-11-13 DIAGNOSIS — R41.3 OTHER AMNESIA: ICD-10-CM

## 2024-11-13 DIAGNOSIS — R40.4 TRANSIENT ALTERATION OF AWARENESS: ICD-10-CM

## 2024-11-13 DIAGNOSIS — F41.1 GAD (GENERALIZED ANXIETY DISORDER): ICD-10-CM

## 2024-11-13 DIAGNOSIS — R41.9 COGNITIVE COMPLAINTS WITH NORMAL EXAM: ICD-10-CM

## 2024-11-13 PROCEDURE — 70551 MRI BRAIN STEM W/O DYE: CPT | Mod: 26,,, | Performed by: RADIOLOGY

## 2024-11-13 PROCEDURE — 95816 EEG AWAKE AND DROWSY: CPT

## 2024-11-13 PROCEDURE — 70551 MRI BRAIN STEM W/O DYE: CPT | Mod: TC

## 2024-11-19 ENCOUNTER — TELEPHONE (OUTPATIENT)
Dept: NEUROLOGY | Facility: CLINIC | Age: 54
End: 2024-11-19
Payer: COMMERCIAL
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
## 2024-11-27 ENCOUNTER — OFFICE VISIT (OUTPATIENT)
Dept: PSYCHIATRY | Facility: CLINIC | Age: 54
End: 2024-11-27
Payer: COMMERCIAL

## 2024-11-27 VITALS — DIASTOLIC BLOOD PRESSURE: 100 MMHG | SYSTOLIC BLOOD PRESSURE: 151 MMHG | HEART RATE: 82 BPM

## 2024-11-27 DIAGNOSIS — F90.2 ATTENTION DEFICIT HYPERACTIVITY DISORDER (ADHD), COMBINED TYPE: ICD-10-CM

## 2024-11-27 DIAGNOSIS — R41.82 ALTERED MENTAL STATUS, UNSPECIFIED ALTERED MENTAL STATUS TYPE: ICD-10-CM

## 2024-11-27 DIAGNOSIS — F41.1 GAD (GENERALIZED ANXIETY DISORDER): ICD-10-CM

## 2024-11-27 DIAGNOSIS — F31.0: Primary | ICD-10-CM

## 2024-11-27 PROCEDURE — 3077F SYST BP >= 140 MM HG: CPT | Mod: CPTII,S$GLB,, | Performed by: PSYCHOLOGIST

## 2024-11-27 PROCEDURE — 99999 PR PBB SHADOW E&M-EST. PATIENT-LVL II: CPT | Mod: PBBFAC,,, | Performed by: PSYCHOLOGIST

## 2024-11-27 PROCEDURE — 3080F DIAST BP >= 90 MM HG: CPT | Mod: CPTII,S$GLB,, | Performed by: PSYCHOLOGIST

## 2024-11-27 PROCEDURE — 99214 OFFICE O/P EST MOD 30 MIN: CPT | Mod: S$GLB,,, | Performed by: PSYCHOLOGIST

## 2024-11-27 PROCEDURE — 1159F MED LIST DOCD IN RCRD: CPT | Mod: CPTII,S$GLB,, | Performed by: PSYCHOLOGIST

## 2024-11-27 RX ORDER — QUETIAPINE FUMARATE 50 MG/1
TABLET, FILM COATED ORAL
Qty: 68 TABLET | Refills: 0 | Status: SHIPPED | OUTPATIENT
Start: 2024-11-27 | End: 2025-02-10

## 2024-11-27 RX ORDER — GUANFACINE 1 MG/1
1 TABLET ORAL NIGHTLY
Qty: 30 TABLET | Refills: 2 | Status: SHIPPED | OUTPATIENT
Start: 2024-11-27 | End: 2025-02-25

## 2024-12-18 ENCOUNTER — OFFICE VISIT (OUTPATIENT)
Dept: PSYCHIATRY | Facility: CLINIC | Age: 54
End: 2024-12-18
Payer: COMMERCIAL

## 2024-12-18 DIAGNOSIS — R41.82 ALTERED MENTAL STATUS, UNSPECIFIED ALTERED MENTAL STATUS TYPE: ICD-10-CM

## 2024-12-18 DIAGNOSIS — F90.2 ATTENTION DEFICIT HYPERACTIVITY DISORDER (ADHD), COMBINED TYPE: ICD-10-CM

## 2024-12-18 DIAGNOSIS — F31.0: Primary | ICD-10-CM

## 2024-12-18 PROCEDURE — 99999 PR PBB SHADOW E&M-EST. PATIENT-LVL II: CPT | Mod: PBBFAC,,, | Performed by: PSYCHOLOGIST

## 2024-12-18 PROCEDURE — 99214 OFFICE O/P EST MOD 30 MIN: CPT | Mod: S$GLB,,, | Performed by: PSYCHOLOGIST

## 2024-12-18 PROCEDURE — 1159F MED LIST DOCD IN RCRD: CPT | Mod: CPTII,S$GLB,, | Performed by: PSYCHOLOGIST

## 2024-12-18 RX ORDER — QUETIAPINE FUMARATE 50 MG/1
50 TABLET, FILM COATED ORAL NIGHTLY
Qty: 30 TABLET | Refills: 2 | Status: SHIPPED | OUTPATIENT
Start: 2024-12-18 | End: 2025-03-18

## 2024-12-18 RX ORDER — FLUOXETINE 10 MG/1
10 CAPSULE ORAL DAILY
Qty: 30 CAPSULE | Refills: 2 | Status: SHIPPED | OUTPATIENT
Start: 2024-12-18 | End: 2025-03-18

## 2024-12-18 RX ORDER — GUANFACINE 2 MG/1
2 TABLET ORAL NIGHTLY
Qty: 30 TABLET | Refills: 2 | Status: SHIPPED | OUTPATIENT
Start: 2024-12-18 | End: 2025-03-18

## 2024-12-18 NOTE — PROGRESS NOTES
"MEDICATION MANAGEMENT SESSION    Name: Tarik Mary  Age: 54 y.o.  : 1970    Preferred Name: Tarik    Referring provider: Gayle Salazar NP    LAST VISIT 24:  Pt reports he felt calmer, more emotionally stable with Abilify 2 mg qd but discontinued it due to increased fatigue throughout the day. He was taking it in the morning. Noticed some improvement in sleep. He remains open to resuming Abilify or alternate medication with similar effects on mood, agitation, mood stabilization. He has not experienced frequent AH, VH since last visit but has frequent PA ideation/thoughts. He continues to socially isolate himself, even from grand-daughter because he's irritable and doesn't want to lash out at her or any other family members.     PLAN:  Start Seroquel 25-50 mg qhs for sleep, mood stabilization, psychotic symptoms;  Start Tenex 1 mg qhs for attention, impulsivity'  D/C Abilify 2 mg qd;  Pt agrees to contact provider if symptoms worsen or medication side effects;  RTC in 4 weeks.     ADHD: overtalkative, blurts out, interrupts, inattentive, forgetful, easily distracted   Depressive Disorder: angry mood, irritable mood, sleep change, tired/fatigued, concentration problems, social isolation/withdrawal   Anxiety Disorder: anxiety/nervousness, fatigue, irritability, sleep problems, concentration problems, avoidance symptoms   Panic Disorder: denied   Manic Disorder: irritable mood, distractability, hyperverbal, agitation, impulsivity   Psychotic Disorder: No recent AH, VH   Substance Use:  Alcohol: daily 1 beer; occasionally have 2     CURRENT VISIT:  Pt reports some improvement with Seroquel 25-50 mg qhs and Tenex 1 mg qhs; he's a little tired but noticeably calmer, less likely to blurt things out or interrupt in conversation. He reports improved sleep--6-7 hours at a time each night and "felt happy for 2-3 days" but then became irritable again, mostly angry, agitated. Reports his mood more even " "overall with transient affect states--"feel more acceptable now." Wants to be acceptable, better control his behavior around others and not offend them. Reports no side effects; appetite is normal; energy level still low.   He continues to struggle with focus, some disorientation and ongoing STM difficulties.     Discussed with pt increase to Seroquel 50 mg qhs, increase to Tenex 2 mg qhs, start Prozac 10 mg qd; reviewed all associated benefits and risks.    PLAN:  Start Prozac 10 mg qd;   Titrate to Tenex 2 mg qhs for attention, impulsivity;   Continue Seroquel 50 mg qhs for sleep, mood stabilization, psychotic symptoms;   Pt agrees to contact provider if symptoms worsen or medication side effects;   RTC in 4 weeks.    Current Symptoms:   ADHD: overtalkative, inattentive, forgetful, easily distracted   Depressive Disorder: tired/fatigued, concentration problems, social isolation/withdrawal   Anxiety Disorder: fatigue, irritability, concentration problems, anxiety, avoids people   Panic Disorder: denied   Manic Disorder: Less agitated, mixed with depression symptoms, improved sleep   Psychotic Disorder: No recent AH, VH   Substance Use:  Alcohol: daily 1 beer; occasionally have 2          11/11/2024     9:32 AM   Adult ADHD Self-Report Scale   How often do you have trouble wrapping up the final details of a project once the chanllenging parts have been done? 1   How often do you have difficulty getting things in order when you have to do a task that requires organization? 1   How often do you have problems remembering appointments or obligations? 3   When you have a task that requires a lot of thought, how often do you avoid or delay getting started? 3   How often do you fidget or squirm with your hands or feet when you have to sit down for a long time? 3   How often do you feel overly active and compelled to do things, like you were driven by a motor? 4   Part A Score 15   How often do you make careless mistakes " "when you have to work on a boring or difficult project? 4   How often do you have difficulty keeping your attention when you are doing boring or repetitive work? 4   How often do you have difficulty concentrating on what people say to you, even when they are speaking to you directly? 4   How often do you misplace or have difficulty finding things at home or at work? 4   How often are you distracted by activity or noise around you? 4   How often do you leave your seat in meetings or other situations in which you are expected to remain seated? 3   How often do you feel restless or fidgety? 3   How often do you have difficulty unwinding and relaxing when you have time to yourself? 3   How often do you find yourself talking too much when you are in social situations? 2   When you're in a conversation, how often do you find yourself finishing the sentences of the people you are talking to before they can finish them themselves? 3   How often do you have difficulty waiting your turn in situations when turn taking is required? 3   How often do you interrupt others when they are busy? 2   Part B Score 39     Review of Systems   Constitutional:  Negative for activity change, appetite change, fatigue and unexpected weight change.   Respiratory:  Negative for shortness of breath.    Psychiatric/Behavioral:  Positive for agitation, decreased concentration and dysphoric mood. Negative for behavioral problems, confusion, hallucinations, self-injury, sleep disturbance and suicidal ideas. The patient is not nervous/anxious and is not hyperactive.       Constitutional:  Vitals:  Most recent vital signs were reviewed.   Last 3 sets of Vitals        10/29/2024     9:42 AM 11/11/2024     8:39 AM 11/27/2024     8:05 AM   Vitals - 1 value per visit   SYSTOLIC 142 157 151   DIASTOLIC 96 93 100   Pulse  66 82   Weight (lb) 171     Weight (kg) 77.565     Height 5' 10" (1.778 m)     BMI (Calculated) 24.5            Psychiatric:  Oriented: x " "3   Attitude: cooperative  Eye Contact: good   Behavior: very relaxed posture in chair; appears calmer  Mood: "okay"  Affect: s/w irritable; otherwise appropriate range   Attention:  distracted; inconsistent attention    Concentration: grossly intact   Thought Process: goal directed   Speech: intelligible  Volume: WNL   Quantity: WNL   Rhythm: WNL  Insight: fair to poor   Threats: no SI / HI   Memory: Recent and remote intact  Psychosis: no recent AH or VH  Estimate of Intellectual Function: average   Judgment:  fair to poor  Relevant Elements of Neurological Exam: normal gait     Allergy Review:   Review of patient's allergies indicates:  No Known Allergies     Medical Problem List:   Patient Active Problem List   Diagnosis    Cognitive complaints with normal exam    Altered mental status    Other amnesia    MADI (generalized anxiety disorder)    Daily consumption of alcohol    Primary hypertension      Encounter Diagnoses   Name Primary?    Altered mental status, unspecified altered mental status type     Attention deficit hyperactivity disorder (ADHD), combined type     Bipolar I disorder, current or most recent episode hypomanic with mood-incongruent psychotic features Yes      PLAN:  Medication Management: Continue current medications. Discussed risks, benefits, and alternatives to treatment plan documented above with patient. I answered all patient questions related to this plan, and patient expressed understanding and agreement.      Follow up in about 4 weeks (around 1/15/2025) for Medication follow up.     Medication List with Changes/Refills   New Medications    FLUOXETINE 10 MG CAPSULE    Take 1 capsule (10 mg total) by mouth once daily.   Current Medications    AMLODIPINE (NORVASC) 2.5 MG TABLET    Take 1 tablet (2.5 mg total) by mouth once daily.    CYANOCOBALAMIN 1,000 MCG/ML INJECTION    Inject 1 mL (1,000 mcg total) into the muscle once a week for 30 days, THEN 1 mL (1,000 mcg total) every 30 days. "   Changed and/or Refilled Medications    Modified Medication Previous Medication    GUANFACINE (TENEX) 2 MG TABLET guanFACINE (TENEX) 1 MG Tab       Take 1 tablet (2 mg total) by mouth every evening.    Take 1 tablet (1 mg total) by mouth every evening.    QUETIAPINE (SEROQUEL) 50 MG TABLET QUEtiapine (SEROQUEL) 50 MG tablet       Take 1 tablet (50 mg total) by mouth every evening.    Take ½ tablet (25 mg total) by mouth every evening for 15 days, THEN 1 tablet (50 mg total) every evening.      Time spent with pt including note preparation: 40 minutes     Amira Iniguez, PhD, MP  Medical Psychologist

## 2025-01-15 NOTE — PROGRESS NOTES
"MEDICATION MANAGEMENT SESSION    Name: Tarik Mary  Age: 54 y.o.  : 1970    Preferred Name: Tarik    LAST VISIT 24:  Pt reports some improvement with Seroquel 25-50 mg qhs and Tenex 1 mg qhs; he's a little tired but noticeably calmer, less likely to blurt things out or interrupt in conversation. He reports improved sleep--6-7 hours at a time each night and "felt happy for 2-3 days" but then became irritable again, mostly angry, agitated. Reports his mood more even overall with transient affect states--"feel more acceptable now." Wants to be acceptable, better control his behavior around others and not offend them. Reports no side effects; appetite is normal; energy level still low.   He continues to struggle with focus, some disorientation and ongoing STM difficulties.     Discussed with pt increase to Seroquel 50 mg qhs, increase to Tenex 2 mg qhs, start Prozac 10 mg qd; reviewed all associated benefits and risks.    PLAN:  Start Prozac 10 mg qd;   Titrate to Tenex 2 mg qhs for attention, impulsivity;   Continue Seroquel 50 mg qhs for sleep, mood stabilization, psychotic symptoms;   Pt agrees to contact provider if symptoms worsen or medication side effects;   RTC in 4 weeks.     ADHD: overtalkative, inattentive, forgetful, easily distracted   Depressive Disorder: tired/fatigued, concentration problems, social isolation/withdrawal   Anxiety Disorder: fatigue, irritability, concentration problems, anxiety, avoids people   Panic Disorder: denied   Manic Disorder: Less agitated, mixed with depression symptoms, improved sleep   Psychotic Disorder: No recent AH,    Substance Use:  Alcohol: daily 1 beer; occasionally have 2     CURRENT VISIT:  Pt reports feeling more even-keeled with addition of Prozac 10 mg qd, increase to Tenex 2 mg qhs, continued Seroquel 50 mg qhs, and appears calmer, less angry--"went to wrong floor twice but not aggravated." He's able to slow down and think before blurting " "out or acting impulsively. Reports being conflicted because sadness/depression is almost gone "but happiness is also gone." Reports missing "the highs" felt before. Now stays to himself at work, feels more in control but as if he's lost his edge; likes it that he tells the truth regardless if it offends others. Coworkers notice he's less talkative.     He experiences frequent passive SI (denies active SI, denies plan, denies intent); would never actively harm self because consistently remains hopeful "there's always tomorrow to try."     Reports no side effects. Improvement in other symptoms--tinnitus/ringing in ears is gone since started medications. Fewer headaches now.    Agreed benefits outweigh cons of medications; discussed taper to Prozac 10 mg every other day. Monitor mood and anxiety symptoms; re-assess at 2-week follow up.    PLAN:   Taper to Prozac 10 mg every other day (5 mg qd);   Continue Tenex 2 mg qhs for attention, impulsivity;   Continue Seroquel 50 mg qhs for sleep, mood stabilization, psychotic symptoms;   Pt agrees to contact provider if symptoms worsen or medication side effects;   RTC in 4 weeks.    Current Symptoms:   ADHD: inattentive, forgetful, easily distracted   Depressive Disorder: tired/fatigued, concentration problems, passive SI, denies active SI   Anxiety Disorder: anxiety/nervousness, fatigue, concentration problems, avoidance symptoms   Panic Disorder: denied   Manic Disorder: denied   Psychotic Disorder: No recent AH, VH   Substance Use:  Alcohol: daily 1 beer; occasionally have 2          11/11/2024     9:32 AM   Adult ADHD Self-Report Scale   How often do you have trouble wrapping up the final details of a project once the chanllenging parts have been done? 1   How often do you have difficulty getting things in order when you have to do a task that requires organization? 1   How often do you have problems remembering appointments or obligations? 3   When you have a task that " requires a lot of thought, how often do you avoid or delay getting started? 3   How often do you fidget or squirm with your hands or feet when you have to sit down for a long time? 3   How often do you feel overly active and compelled to do things, like you were driven by a motor? 4   Part A Score 15   How often do you make careless mistakes when you have to work on a boring or difficult project? 4   How often do you have difficulty keeping your attention when you are doing boring or repetitive work? 4   How often do you have difficulty concentrating on what people say to you, even when they are speaking to you directly? 4   How often do you misplace or have difficulty finding things at home or at work? 4   How often are you distracted by activity or noise around you? 4   How often do you leave your seat in meetings or other situations in which you are expected to remain seated? 3   How often do you feel restless or fidgety? 3   How often do you have difficulty unwinding and relaxing when you have time to yourself? 3   How often do you find yourself talking too much when you are in social situations? 2   When you're in a conversation, how often do you find yourself finishing the sentences of the people you are talking to before they can finish them themselves? 3   How often do you have difficulty waiting your turn in situations when turn taking is required? 3   How often do you interrupt others when they are busy? 2   Part B Score 39     Review of Systems   Constitutional:  Positive for fatigue. Negative for activity change, appetite change and unexpected weight change.   Respiratory:  Negative for shortness of breath.    Psychiatric/Behavioral:  Positive for decreased concentration and dysphoric mood. Negative for agitation, behavioral problems, confusion, hallucinations, self-injury, sleep disturbance and suicidal ideas. The patient is nervous/anxious. The patient is not hyperactive.       Constitutional:  Vitals:  " Most recent vital signs were reviewed.   Last 3 sets of Vitals        10/29/2024     9:42 AM 11/11/2024     8:39 AM 11/27/2024     8:05 AM   Vitals - 1 value per visit   SYSTOLIC 142 157 151   DIASTOLIC 96 93 100   Pulse  66 82   Weight (lb) 171     Weight (kg) 77.565     Height 5' 10" (1.778 m)     BMI (Calculated) 24.5            Psychiatric:  Oriented: x 3   Attitude: cooperative   Eye Contact: good   Behavior: wnl   Mood: "less depressed, even-keeled"  Affect: appropriate range   Attention:  distracted    Concentration: grossly intact   Thought Process: mostly goal directed; frequently tangential  Speech: intelligible  Volume: WNL   Quantity: less hyper-verbal but still talkative   Rhythm: WNL  Insight: fair to good   Threats: passive SI, denies SI, denies plan, no intent/no HI   Memory: Impaired to some degree and Remote impaired  Psychosis: denies all   Estimate of Intellectual Function: average   Judgment:  fair  Relevant Elements of Neurological Exam: normal gait     Allergy Review:   Review of patient's allergies indicates:  No Known Allergies     Medical Problem List:   Patient Active Problem List   Diagnosis    Cognitive complaints with normal exam    Altered mental status    Other amnesia    MADI (generalized anxiety disorder)    Daily consumption of alcohol    Primary hypertension      Encounter Diagnoses   Name Primary?    Altered mental status, unspecified altered mental status type     Attention deficit hyperactivity disorder (ADHD), combined type     Bipolar I disorder, current or most recent episode hypomanic with mood-incongruent psychotic features Yes    MADI (generalized anxiety disorder)     Cognitive complaints with normal exam       PLAN:  Medication Management: Continue current medications. Discussed risks, benefits, and alternatives to treatment plan documented above with patient. I answered all patient questions related to this plan, and patient expressed understanding and agreement.  "     Follow up in about 4 weeks (around 2/13/2025) for Medication follow up.     Medication List with Changes/Refills   Current Medications    AMLODIPINE (NORVASC) 2.5 MG TABLET    Take 1 tablet (2.5 mg total) by mouth once daily.    CYANOCOBALAMIN 1,000 MCG/ML INJECTION    Inject 1 mL (1,000 mcg total) into the muscle once a week for 30 days, THEN 1 mL (1,000 mcg total) every 30 days.    FLUOXETINE 10 MG CAPSULE    Take 1 capsule (10 mg total) by mouth once daily.    GUANFACINE (TENEX) 2 MG TABLET    Take 1 tablet (2 mg total) by mouth every evening.    QUETIAPINE (SEROQUEL) 50 MG TABLET    Take 1 tablet (50 mg total) by mouth every evening.      Time spent with pt including note preparation: 40 minutes     Amira Iniguez, PhD, MP  Medical Psychologist

## 2025-01-16 ENCOUNTER — OFFICE VISIT (OUTPATIENT)
Dept: PSYCHIATRY | Facility: CLINIC | Age: 55
End: 2025-01-16
Payer: COMMERCIAL

## 2025-01-16 DIAGNOSIS — F31.0: Primary | ICD-10-CM

## 2025-01-16 DIAGNOSIS — F41.1 GAD (GENERALIZED ANXIETY DISORDER): ICD-10-CM

## 2025-01-16 DIAGNOSIS — R41.9 COGNITIVE COMPLAINTS WITH NORMAL EXAM: ICD-10-CM

## 2025-01-16 DIAGNOSIS — R41.82 ALTERED MENTAL STATUS, UNSPECIFIED ALTERED MENTAL STATUS TYPE: ICD-10-CM

## 2025-01-16 DIAGNOSIS — F90.2 ATTENTION DEFICIT HYPERACTIVITY DISORDER (ADHD), COMBINED TYPE: ICD-10-CM

## 2025-01-16 PROCEDURE — 1159F MED LIST DOCD IN RCRD: CPT | Mod: CPTII,S$GLB,, | Performed by: PSYCHOLOGIST

## 2025-01-16 PROCEDURE — 99214 OFFICE O/P EST MOD 30 MIN: CPT | Mod: S$GLB,,, | Performed by: PSYCHOLOGIST

## 2025-01-16 PROCEDURE — 99999 PR PBB SHADOW E&M-EST. PATIENT-LVL II: CPT | Mod: PBBFAC,,, | Performed by: PSYCHOLOGIST

## 2025-02-13 ENCOUNTER — OFFICE VISIT (OUTPATIENT)
Dept: PSYCHIATRY | Facility: CLINIC | Age: 55
End: 2025-02-13
Payer: COMMERCIAL

## 2025-02-13 VITALS — DIASTOLIC BLOOD PRESSURE: 85 MMHG | HEART RATE: 63 BPM | SYSTOLIC BLOOD PRESSURE: 145 MMHG

## 2025-02-13 DIAGNOSIS — Z78.9 DAILY CONSUMPTION OF ALCOHOL: ICD-10-CM

## 2025-02-13 DIAGNOSIS — R41.82 ALTERED MENTAL STATUS, UNSPECIFIED ALTERED MENTAL STATUS TYPE: ICD-10-CM

## 2025-02-13 DIAGNOSIS — F90.2 ATTENTION DEFICIT HYPERACTIVITY DISORDER (ADHD), COMBINED TYPE: ICD-10-CM

## 2025-02-13 DIAGNOSIS — R41.9 COGNITIVE COMPLAINTS WITH NORMAL EXAM: ICD-10-CM

## 2025-02-13 DIAGNOSIS — F31.0: Primary | ICD-10-CM

## 2025-02-13 DIAGNOSIS — F41.1 GAD (GENERALIZED ANXIETY DISORDER): ICD-10-CM

## 2025-02-13 PROCEDURE — 3079F DIAST BP 80-89 MM HG: CPT | Mod: CPTII,S$GLB,, | Performed by: PSYCHOLOGIST

## 2025-02-13 PROCEDURE — 99999 PR PBB SHADOW E&M-EST. PATIENT-LVL II: CPT | Mod: PBBFAC,,, | Performed by: PSYCHOLOGIST

## 2025-02-13 PROCEDURE — 1159F MED LIST DOCD IN RCRD: CPT | Mod: CPTII,S$GLB,, | Performed by: PSYCHOLOGIST

## 2025-02-13 PROCEDURE — 3077F SYST BP >= 140 MM HG: CPT | Mod: CPTII,S$GLB,, | Performed by: PSYCHOLOGIST

## 2025-02-13 PROCEDURE — 99215 OFFICE O/P EST HI 40 MIN: CPT | Mod: S$GLB,,, | Performed by: PSYCHOLOGIST

## 2025-02-13 NOTE — PROGRESS NOTES
"MEDICATION MANAGEMENT SESSION    Name: Tarik Mary  Age: 54 y.o.  : 1970    Preferred Name: Tarik    Referring provider: Gayle Salazar NP    Reason for Visit:  Medication follow up    LAST VISIT 25:  Pt reports feeling more even-keeled with addition of Prozac 10 mg qd, increase to Tenex 2 mg qhs, continued Seroquel 50 mg qhs, and appears calmer, less angry--"went to wrong floor twice but not aggravated." He's able to slow down and think before blurting out or acting impulsively. Reports being conflicted because sadness/depression is almost gone "but happiness is also gone." Reports missing "the highs" felt before. Now stays to himself at work, feels more in control but as if he's lost his edge; likes it that he tells the truth regardless if it offends others. Coworkers notice he's less talkative.     He experiences frequent passive SI (denies active SI, denies plan, denies intent); would never actively harm self because consistently remains hopeful "there's always tomorrow to try."     Reports no side effects. Improvement in other symptoms--tinnitus/ringing in ears is gone since started medications. Fewer headaches now.    Agreed benefits outweigh cons of medications; discussed taper to Prozac 10 mg every other day. Monitor mood and anxiety symptoms; re-assess at 2-week follow up.    PLAN:   Taper to Prozac 10 mg every other day (5 mg qd);   Continue Tenex 2 mg qhs for attention, impulsivity;   Continue Seroquel 50 mg qhs for sleep, mood stabilization, psychotic symptoms;   Pt agrees to contact provider if symptoms worsen or medication side effects;   RTC in 4 weeks.     ADHD: inattentive, forgetful, easily distracted   Depressive Disorder: tired/fatigued, concentration problems, passive SI, denies active SI   Anxiety Disorder: anxiety/nervousness, fatigue, concentration problems, avoidance symptoms   Panic Disorder: denied   Manic Disorder: denied   Psychotic Disorder: No recent AH, VH " "  Substance Use:  Alcohol: daily 1 beer; occasionally have 2     CURRENT VISIT:  Pt realizes "I'm not happy, I'm not myself" with medication. Not interacting with co-workers the same way though was never overly friendly/familiar with them and doesn't want to hear about their personal lives--feels he's lost his edge. As dispatcher, always in communication and conversations have been different but not sure how.     Discussed with pt that goal of psychiatric medications is not to make him feel altered or not himself; we tried various medications to reduce anger, irritability, impulsivity when he reported not wanting to be like that anymore with others; he was depressed, angry but now okay with that. Recommended that he follow through with additional psychiatry referrals placed by Gayle Salazar--neuropsychological testing and counseling. Pt not interested in therapy; will follow through with neuropsychological testing when scheduled.   Reviewed with pt that stimulant medication for attention/focus issues contraindicated due to his baseline level of anger and agitation. Pt states understanding but "memory and focus are what I need help with."    Pt expressed concern for provider's feelings as we discussed terminating treatment with provider though both concur psychiatric medication is not addressing his primary complaints. "I like you as a person; think you're very nice person, I can talk to you." Reassured pt provider remains on treatment team; he can follow up as needed.    PLAN:  D/C Prozac 10 mg qd every other day (5 mg qd);  Check on referral to Dr. Addison Avilez for neuropsychological testing; consult Gayle Salazar NP in neurology;  Continue Seroquel 50 mg qhs for sleep, mood stabilization, psychotic symptoms;   Continue Guanfacine (Tenex) 2 mg qhs for attention, impulsivity;  RTC as needed for medication management.    Current symptoms:   ADHD: inattentive, forgetful, easily distracted, blurts out, interrupts " frequently but apologizes   Depressive Disorder: tired/fatigued, concentration problems   Anxiety Disorder: anxiety/nervousness, fatigue, concentration problems, avoidance symptoms   Panic Disorder: denied   Manic Disorder: denied   Psychotic Disorder: No recent AH, VH; Frequently experiences shadowy figures; changed initial report of thinking TV speaking to him--more that stories resonate with him   Substance Use:  Alcohol: daily 1 beer; occasionally have 2          11/11/2024     9:32 AM   Adult ADHD Self-Report Scale   How often do you have trouble wrapping up the final details of a project once the chanllenging parts have been done? 1   How often do you have difficulty getting things in order when you have to do a task that requires organization? 1   How often do you have problems remembering appointments or obligations? 3   When you have a task that requires a lot of thought, how often do you avoid or delay getting started? 3   How often do you fidget or squirm with your hands or feet when you have to sit down for a long time? 3   How often do you feel overly active and compelled to do things, like you were driven by a motor? 4   Part A Score 15   How often do you make careless mistakes when you have to work on a boring or difficult project? 4   How often do you have difficulty keeping your attention when you are doing boring or repetitive work? 4   How often do you have difficulty concentrating on what people say to you, even when they are speaking to you directly? 4   How often do you misplace or have difficulty finding things at home or at work? 4   How often are you distracted by activity or noise around you? 4   How often do you leave your seat in meetings or other situations in which you are expected to remain seated? 3   How often do you feel restless or fidgety? 3   How often do you have difficulty unwinding and relaxing when you have time to yourself? 3   How often do you find yourself talking too much  "when you are in social situations? 2   When you're in a conversation, how often do you find yourself finishing the sentences of the people you are talking to before they can finish them themselves? 3   How often do you have difficulty waiting your turn in situations when turn taking is required? 3   How often do you interrupt others when they are busy? 2   Part B Score 39     Review of Systems   Constitutional:  Negative for activity change, appetite change, fatigue and unexpected weight change.   Respiratory:  Negative for shortness of breath.    Psychiatric/Behavioral:  Positive for dysphoric mood. Negative for agitation, behavioral problems, confusion, decreased concentration, hallucinations, self-injury, sleep disturbance and suicidal ideas. The patient is nervous/anxious. The patient is not hyperactive.       Constitutional:  Vitals:  Most recent vital signs were reviewed.   Last 3 sets of Vitals        11/11/2024     8:39 AM 11/27/2024     8:05 AM 2/13/2025     8:26 AM   Vitals - 1 value per visit   SYSTOLIC 157 151 145   DIASTOLIC 93 100 85   Pulse 66 82 63          Psychiatric:  Oriented: x 3   Attitude: cooperative   Eye Contact: infrequent   Behavior: less restless than usual  Mood: "blah"  Affect: moran facial expression is baseline; otherwise appropriate range   Attention: distracted, looking out window  Concentration: grossly intact   Thought Process: goal directed   Speech: intelligible  Volume: WNL   Quantity: WNL   Rhythm: WNL  Insight: fair    Threats: no SI / HI   Memory: Grossly intact  Psychosis: denies all   Estimate of Intellectual Function: average   Judgment:  fair  Relevant Elements of Neurological Exam: normal gait     Allergy Review:   Review of patient's allergies indicates:  No Known Allergies     Medical Problem List:   Patient Active Problem List   Diagnosis    Cognitive complaints with normal exam    Altered mental status    Other amnesia    MADI (generalized anxiety disorder)    Daily " consumption of alcohol    Primary hypertension      Encounter Diagnoses   Name Primary?    Altered mental status, unspecified altered mental status type     Attention deficit hyperactivity disorder (ADHD), combined type     Bipolar I disorder, current or most recent episode hypomanic with mood-incongruent psychotic features Yes    MADI (generalized anxiety disorder)     Cognitive complaints with normal exam     Daily consumption of alcohol       PLAN:  Medication Management: Continue current medications. Discussed risks, benefits, and alternatives to treatment plan documented above with patient. I answered all patient questions related to this plan, and patient expressed understanding and agreement.      Follow up if symptoms worsen or fail to improve, for Medication follow up.     Medication List with Changes/Refills   Current Medications    AMLODIPINE (NORVASC) 2.5 MG TABLET    Take 1 tablet (2.5 mg total) by mouth once daily.    CYANOCOBALAMIN 1,000 MCG/ML INJECTION    Inject 1 mL (1,000 mcg total) into the muscle once a week for 30 days, THEN 1 mL (1,000 mcg total) every 30 days.    GUANFACINE (TENEX) 2 MG TABLET    Take 1 tablet (2 mg total) by mouth every evening.    QUETIAPINE (SEROQUEL) 50 MG TABLET    Take 1 tablet (50 mg total) by mouth every evening.   Discontinued Medications    FLUOXETINE 10 MG CAPSULE    Take 1 capsule (10 mg total) by mouth once daily.      Time spent with pt including note preparation: 45 minutes     Amira Iniguez, PhD, MP  Medical Psychologist

## 2025-03-27 ENCOUNTER — PATIENT MESSAGE (OUTPATIENT)
Dept: PSYCHIATRY | Facility: CLINIC | Age: 55
End: 2025-03-27
Payer: COMMERCIAL

## 2025-03-27 DIAGNOSIS — F90.2 ATTENTION DEFICIT HYPERACTIVITY DISORDER (ADHD), COMBINED TYPE: ICD-10-CM

## 2025-03-27 DIAGNOSIS — F31.0: ICD-10-CM

## 2025-03-27 RX ORDER — QUETIAPINE FUMARATE 50 MG/1
50 TABLET, FILM COATED ORAL NIGHTLY
Qty: 30 TABLET | Refills: 2 | Status: SHIPPED | OUTPATIENT
Start: 2025-03-27 | End: 2025-06-25

## 2025-03-27 RX ORDER — GUANFACINE 2 MG/1
2 TABLET ORAL NIGHTLY
Qty: 30 TABLET | Refills: 2 | Status: SHIPPED | OUTPATIENT
Start: 2025-03-27 | End: 2025-06-25

## 2025-05-21 ENCOUNTER — OFFICE VISIT (OUTPATIENT)
Dept: FAMILY MEDICINE | Facility: CLINIC | Age: 55
End: 2025-05-21
Payer: COMMERCIAL

## 2025-05-21 ENCOUNTER — LAB VISIT (OUTPATIENT)
Dept: LAB | Facility: HOSPITAL | Age: 55
End: 2025-05-21
Attending: NURSE PRACTITIONER
Payer: COMMERCIAL

## 2025-05-21 VITALS
SYSTOLIC BLOOD PRESSURE: 170 MMHG | DIASTOLIC BLOOD PRESSURE: 96 MMHG | TEMPERATURE: 97 F | HEIGHT: 70 IN | OXYGEN SATURATION: 98 % | WEIGHT: 173.75 LBS | BODY MASS INDEX: 24.87 KG/M2 | HEART RATE: 62 BPM

## 2025-05-21 DIAGNOSIS — H93.13 TINNITUS OF BOTH EARS: ICD-10-CM

## 2025-05-21 DIAGNOSIS — K92.1 BLOODY STOOLS: ICD-10-CM

## 2025-05-21 DIAGNOSIS — I10 PRIMARY HYPERTENSION: Primary | ICD-10-CM

## 2025-05-21 LAB
ABSOLUTE EOSINOPHIL (OHS): 0.25 K/UL
ABSOLUTE MONOCYTE (OHS): 0.48 K/UL (ref 0.3–1)
ABSOLUTE NEUTROPHIL COUNT (OHS): 3.37 K/UL (ref 1.8–7.7)
ALBUMIN SERPL BCP-MCNC: 4.2 G/DL (ref 3.5–5.2)
ALP SERPL-CCNC: 45 UNIT/L (ref 40–150)
ALT SERPL W/O P-5'-P-CCNC: 27 UNIT/L (ref 10–44)
ANION GAP (OHS): 8 MMOL/L (ref 8–16)
AST SERPL-CCNC: 27 UNIT/L (ref 11–45)
BASOPHILS # BLD AUTO: 0.05 K/UL
BASOPHILS NFR BLD AUTO: 0.9 %
BILIRUB SERPL-MCNC: 1.1 MG/DL (ref 0.1–1)
BILIRUB UR QL STRIP.AUTO: NEGATIVE
BUN SERPL-MCNC: 12 MG/DL (ref 6–20)
CALCIUM SERPL-MCNC: 9.3 MG/DL (ref 8.7–10.5)
CHLORIDE SERPL-SCNC: 106 MMOL/L (ref 95–110)
CLARITY UR: CLEAR
CO2 SERPL-SCNC: 27 MMOL/L (ref 23–29)
COLOR UR AUTO: YELLOW
CREAT SERPL-MCNC: 1.1 MG/DL (ref 0.5–1.4)
ERYTHROCYTE [DISTWIDTH] IN BLOOD BY AUTOMATED COUNT: 12.1 % (ref 11.5–14.5)
GFR SERPLBLD CREATININE-BSD FMLA CKD-EPI: >60 ML/MIN/1.73/M2
GLUCOSE SERPL-MCNC: 86 MG/DL (ref 70–110)
GLUCOSE UR QL STRIP: NEGATIVE
HCT VFR BLD AUTO: 48.1 % (ref 40–54)
HGB BLD-MCNC: 15.7 GM/DL (ref 14–18)
HGB UR QL STRIP: NEGATIVE
IMM GRANULOCYTES # BLD AUTO: 0.02 K/UL (ref 0–0.04)
IMM GRANULOCYTES NFR BLD AUTO: 0.4 % (ref 0–0.5)
KETONES UR QL STRIP: NEGATIVE
LEUKOCYTE ESTERASE UR QL STRIP: NEGATIVE
LYMPHOCYTES # BLD AUTO: 1.39 K/UL (ref 1–4.8)
MCH RBC QN AUTO: 31.1 PG (ref 27–31)
MCHC RBC AUTO-ENTMCNC: 32.6 G/DL (ref 32–36)
MCV RBC AUTO: 95 FL (ref 82–98)
NITRITE UR QL STRIP: NEGATIVE
NUCLEATED RBC (/100WBC) (OHS): 0 /100 WBC
PH UR STRIP: 7 [PH]
PLATELET # BLD AUTO: 251 K/UL (ref 150–450)
PMV BLD AUTO: 11.6 FL (ref 9.2–12.9)
POTASSIUM SERPL-SCNC: 4.1 MMOL/L (ref 3.5–5.1)
PROT SERPL-MCNC: 7.7 GM/DL (ref 6–8.4)
PROT UR QL STRIP: NEGATIVE
RBC # BLD AUTO: 5.05 M/UL (ref 4.6–6.2)
RELATIVE EOSINOPHIL (OHS): 4.5 %
RELATIVE LYMPHOCYTE (OHS): 25 % (ref 18–48)
RELATIVE MONOCYTE (OHS): 8.6 % (ref 4–15)
RELATIVE NEUTROPHIL (OHS): 60.6 % (ref 38–73)
SODIUM SERPL-SCNC: 141 MMOL/L (ref 136–145)
SP GR UR STRIP: 1.01
UROBILINOGEN UR STRIP-ACNC: NEGATIVE EU/DL
WBC # BLD AUTO: 5.56 K/UL (ref 3.9–12.7)

## 2025-05-21 PROCEDURE — 3077F SYST BP >= 140 MM HG: CPT | Mod: CPTII,S$GLB,, | Performed by: NURSE PRACTITIONER

## 2025-05-21 PROCEDURE — 1159F MED LIST DOCD IN RCRD: CPT | Mod: CPTII,S$GLB,, | Performed by: NURSE PRACTITIONER

## 2025-05-21 PROCEDURE — 99999 PR PBB SHADOW E&M-EST. PATIENT-LVL IV: CPT | Mod: PBBFAC,,, | Performed by: NURSE PRACTITIONER

## 2025-05-21 PROCEDURE — 36415 COLL VENOUS BLD VENIPUNCTURE: CPT | Mod: PO

## 2025-05-21 PROCEDURE — G2211 COMPLEX E/M VISIT ADD ON: HCPCS | Mod: S$GLB,,, | Performed by: NURSE PRACTITIONER

## 2025-05-21 PROCEDURE — 3080F DIAST BP >= 90 MM HG: CPT | Mod: CPTII,S$GLB,, | Performed by: NURSE PRACTITIONER

## 2025-05-21 PROCEDURE — 85730 THROMBOPLASTIN TIME PARTIAL: CPT

## 2025-05-21 PROCEDURE — 80053 COMPREHEN METABOLIC PANEL: CPT

## 2025-05-21 PROCEDURE — 99214 OFFICE O/P EST MOD 30 MIN: CPT | Mod: S$GLB,,, | Performed by: NURSE PRACTITIONER

## 2025-05-21 PROCEDURE — 1160F RVW MEDS BY RX/DR IN RCRD: CPT | Mod: CPTII,S$GLB,, | Performed by: NURSE PRACTITIONER

## 2025-05-21 PROCEDURE — 81003 URINALYSIS AUTO W/O SCOPE: CPT

## 2025-05-21 PROCEDURE — 3008F BODY MASS INDEX DOCD: CPT | Mod: CPTII,S$GLB,, | Performed by: NURSE PRACTITIONER

## 2025-05-21 PROCEDURE — 85025 COMPLETE CBC W/AUTO DIFF WBC: CPT

## 2025-05-21 RX ORDER — NAPROXEN 500 MG/1
500 TABLET ORAL 2 TIMES DAILY WITH MEALS
COMMUNITY

## 2025-05-21 RX ORDER — HYDROCODONE BITARTRATE AND ACETAMINOPHEN 5; 325 MG/1; MG/1
1 TABLET ORAL EVERY 6 HOURS PRN
COMMUNITY
End: 2025-05-21

## 2025-05-21 RX ORDER — CYCLOBENZAPRINE HCL 10 MG
10 TABLET ORAL 3 TIMES DAILY PRN
COMMUNITY
End: 2025-05-21

## 2025-05-22 LAB — APTT PPP: 28.6 SECONDS (ref 21–32)

## 2025-05-23 ENCOUNTER — HOSPITAL ENCOUNTER (OUTPATIENT)
Dept: PREADMISSION TESTING | Facility: HOSPITAL | Age: 55
Discharge: HOME | End: 2025-05-23
Attending: FAMILY MEDICINE
Payer: COMMERCIAL

## 2025-05-23 ENCOUNTER — RESULTS FOLLOW-UP (OUTPATIENT)
Dept: FAMILY MEDICINE | Facility: CLINIC | Age: 55
End: 2025-05-23

## 2025-05-23 ENCOUNTER — PATIENT MESSAGE (OUTPATIENT)
Dept: FAMILY MEDICINE | Facility: CLINIC | Age: 55
End: 2025-05-23
Payer: COMMERCIAL

## 2025-05-23 DIAGNOSIS — K92.1 BLOODY STOOLS: Primary | ICD-10-CM

## 2025-05-23 RX ORDER — SODIUM, POTASSIUM,MAG SULFATES 17.5-3.13G
1 SOLUTION, RECONSTITUTED, ORAL ORAL DAILY
Qty: 354 ML | Refills: 0 | Status: SHIPPED | OUTPATIENT
Start: 2025-05-23 | End: 2025-05-25
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
## 2025-06-11 ENCOUNTER — HOSPITAL ENCOUNTER (OUTPATIENT)
Dept: ENDOSCOPY | Facility: HOSPITAL | Age: 55
Discharge: HOME | End: 2025-06-11
Attending: NURSE PRACTITIONER | Admitting: COLON & RECTAL SURGERY
Payer: COMMERCIAL

## 2025-06-11 ENCOUNTER — ANESTHESIA EVENT (OUTPATIENT)
Dept: ENDOSCOPY | Facility: HOSPITAL | Age: 55
End: 2025-06-11
Payer: COMMERCIAL

## 2025-06-11 ENCOUNTER — ANESTHESIA (OUTPATIENT)
Dept: ENDOSCOPY | Facility: HOSPITAL | Age: 55
End: 2025-06-11
Payer: COMMERCIAL

## 2025-06-11 VITALS
WEIGHT: 170 LBS | DIASTOLIC BLOOD PRESSURE: 80 MMHG | BODY MASS INDEX: 24.34 KG/M2 | TEMPERATURE: 98 F | OXYGEN SATURATION: 98 % | HEART RATE: 83 BPM | RESPIRATION RATE: 17 BRPM | SYSTOLIC BLOOD PRESSURE: 125 MMHG | HEIGHT: 70 IN

## 2025-06-11 DIAGNOSIS — K92.1 BLOODY STOOLS: ICD-10-CM

## 2025-06-11 PROCEDURE — 25000003 PHARM REV CODE 250: Performed by: COLON & RECTAL SURGERY

## 2025-06-11 PROCEDURE — 37000008 HC ANESTHESIA 1ST 15 MINUTES

## 2025-06-11 PROCEDURE — 63600175 PHARM REV CODE 636 W HCPCS: Performed by: NURSE ANESTHETIST, CERTIFIED REGISTERED

## 2025-06-11 PROCEDURE — 27201022: Performed by: COLON & RECTAL SURGERY

## 2025-06-11 PROCEDURE — 37000009 HC ANESTHESIA EA ADD 15 MINS

## 2025-06-11 RX ORDER — LIDOCAINE HYDROCHLORIDE 10 MG/ML
INJECTION, SOLUTION EPIDURAL; INFILTRATION; INTRACAUDAL; PERINEURAL
Status: DISCONTINUED | OUTPATIENT
Start: 2025-06-11 | End: 2025-06-11

## 2025-06-11 RX ORDER — PROPOFOL 10 MG/ML
VIAL (ML) INTRAVENOUS
Status: DISCONTINUED | OUTPATIENT
Start: 2025-06-11 | End: 2025-06-11

## 2025-06-11 RX ORDER — DEXTROMETHORPHAN/PSEUDOEPHED 2.5-7.5/.8
DROPS ORAL
Status: COMPLETED | OUTPATIENT
Start: 2025-06-11 | End: 2025-06-11

## 2025-06-11 RX ADMIN — PROPOFOL 20 MG: 10 INJECTION, EMULSION INTRAVENOUS at 11:06

## 2025-06-11 RX ADMIN — SIMETHICONE 200 MG: 20 SUSPENSION/ DROPS ORAL at 11:06

## 2025-06-11 RX ADMIN — PROPOFOL 40 MG: 10 INJECTION, EMULSION INTRAVENOUS at 11:06

## 2025-06-11 RX ADMIN — PROPOFOL 50 MG: 10 INJECTION, EMULSION INTRAVENOUS at 11:06

## 2025-06-11 RX ADMIN — PROPOFOL 90 MG: 10 INJECTION, EMULSION INTRAVENOUS at 11:06

## 2025-06-11 RX ADMIN — LIDOCAINE HYDROCHLORIDE 50 MG: 10 SOLUTION INTRAVENOUS at 11:06

## 2025-06-11 NOTE — ANESTHESIA POSTPROCEDURE EVALUATION
Anesthesia Post Evaluation    Patient: Tarik Mary    Procedure(s) Performed: * No procedures listed *    Final Anesthesia Type: MAC      Patient location during evaluation: PACU  Patient participation: Yes- Able to Participate  Level of consciousness: awake and alert and oriented  Post-procedure vital signs: reviewed and stable  Pain management: adequate  Airway patency: patent  GIOVANI mitigation strategies: Multimodal analgesia and Extubation and recovery carried out in lateral, semiupright, or other nonsupine position  PONV status at discharge: No PONV  Anesthetic complications: no      Cardiovascular status: blood pressure returned to baseline and hemodynamically stable  Respiratory status: unassisted and spontaneous ventilation  Hydration status: euvolemic  Follow-up not needed.  Comments: Report given to PACU RN. Hand Off Tool Used. RN given opportunity to ask questions or clarify concerns. No Concerns verbalized. Pt. Left in stable condition. SV. Vital Signs Return to Near Baseline. No s/s of distress noted.             No case tracking events are documented in the log.      Pain/Parth Score: No data recorded

## 2025-06-11 NOTE — PLAN OF CARE
Dr. Joseph  at bedside discussing findings. VSS. Patient alert. No N/V. No bleeding, no pain. Patient released from unit.

## 2025-06-11 NOTE — BRIEF OP NOTE
[facility])  Brief Operative Note     SUMMARY     Surgery Date: 6/11/2025     Surgeon: Dhruv Joseph MD    Pre-op Diagnosis:  Screen for Colon Cancer    Post-op Diagnosis:  Screen for Colon Cancer    Procedure: Colonoscopy    Anesthesia: MAC    Description of the findings of the procedure: no polyps or masses; hemorrhoids banded    Estimated Blood Loss: minimal         Specimens:   Specimen (24h ago, onward)      None

## 2025-06-11 NOTE — ANESTHESIA PREPROCEDURE EVALUATION
06/11/2025  Tarik Mary is a 55 y.o., male.      Pre-op Assessment    I have reviewed the Patient Summary Reports.     I have reviewed the Nursing Notes. I have reviewed the NPO Status.   I have reviewed the Medications.     Review of Systems  Anesthesia Hx:  No problems with previous Anesthesia             Denies Family Hx of Anesthesia complications.    Denies Personal Hx of Anesthesia complications.                    Social:  Alcohol Use       Hematology/Oncology:  Hematology Normal   Oncology Normal                                   EENT/Dental:  EENT/Dental Normal           Cardiovascular:     Hypertension                                          Pulmonary:  Pulmonary Normal                       Renal/:  Renal/ Normal                 Hepatic/GI:  Hepatic/GI Normal                    Musculoskeletal:  Musculoskeletal Normal                Neurological:  Neurology Normal                                      Endocrine:  Endocrine Normal            Dermatological:  Skin Normal    Psych:  Psychiatric History                  Physical Exam  General: Well nourished, Cooperative, Alert and Oriented    Airway:  Mallampati: II   Mouth Opening: Normal  TM Distance: Normal  Tongue: Normal  Neck ROM: Normal ROM    Dental:  Intact    Chest/Lungs:  Clear to auscultation, Normal Respiratory Rate    Heart:  Rate: Normal  Rhythm: Regular Rhythm        Anesthesia Plan  Type of Anesthesia, risks & benefits discussed:    Anesthesia Type: MAC  Intra-op Monitoring Plan: Standard ASA Monitors  Induction:  IV  Informed Consent: Informed consent signed with the Patient and all parties understand the risks and agree with anesthesia plan.  All questions answered.   ASA Score: 2  Day of Surgery Review of History & Physical: H&P Update referred to the surgeon/provider.I have interviewed and examined the patient. I have  reviewed the patient's H&P dated: There are no significant changes.     Ready For Surgery From Anesthesia Perspective.     .

## 2025-06-11 NOTE — TRANSFER OF CARE
"Anesthesia Transfer of Care Note    Patient: aTrik Mary    Procedure(s) Performed: * No procedures listed *    Patient location: PACU    Anesthesia Type: MAC    Transport from OR: Transported from OR on room air with adequate spontaneous ventilation    Post pain: adequate analgesia    Post assessment: no apparent anesthetic complications    Post vital signs: stable    Level of consciousness: responds to stimulation and awake    Nausea/Vomiting: no nausea/vomiting    Complications: none    Transfer of care protocol was followedComments: Report given to PACU RN at bedside. Hand off tool used. RN given opportunity to ask questions or clarify concerns. No Concerns verbalized. RN was asked if ready to assume care of patient. RN verbally confirmed. Pt. left in stable condition. SV. Vital Signs Return to Near Baseline. No s/s of distress noted.       Last vitals: Visit Vitals  BP (!) 158/93   Pulse (!) 59   Temp 36.7 °C (98.1 °F)   Resp 18   Ht 5' 10" (1.778 m)   Wt 77.1 kg (170 lb)   SpO2 99%   BMI 24.39 kg/m²     "

## 2025-06-11 NOTE — H&P
[facility] ([facility])  Colon and Rectal Surgery  History & Physical    Patient Name: Tarik Mary  MRN: [id number]  Admission Date: 6/11/2025  Attending Physician: Dhruv Joseph MD  Primary Care Provider: Nisa, Primary Doctor    Patient information was obtained from patient and medical records.    Subjective:     Chief Complaint/Reason for Admission: Here for Colonoscopy    History of Present Illness:  Patient is a 55 y.o. male presents for colonoscopy. Never had colonoscopy. Having recent hematochezia in the toilet and when wiping, no melena or change in bowel habits. No personal or fam hx of CRC, polyps or IBD.    Current Outpatient Medications on File Prior to Encounter   Medication Sig    amLODIPine (NORVASC) 2.5 MG tablet Take 1 tablet (2.5 mg total) by mouth once daily.    cyanocobalamin 1,000 mcg/mL injection Inject 1 mL (1,000 mcg total) into the muscle once a week for 30 days, THEN 1 mL (1,000 mcg total) every 30 days.    guanFACINE (TENEX) 2 MG tablet Take 1 tablet (2 mg total) by mouth every evening.    QUEtiapine (SEROQUEL) 50 MG tablet Take 1 tablet (50 mg total) by mouth every evening.    naproxen (NAPROSYN) 500 MG tablet Take 500 mg by mouth 2 (two) times daily with meals.     No current facility-administered medications on file prior to encounter.       Review of patient's allergies indicates:  No Known Allergies    History reviewed. No pertinent past medical history.  History reviewed. No pertinent surgical history.  Family History    None       Tobacco Use    Smoking status: Never    Smokeless tobacco: Never   Vaping Use    Vaping status: Never Used   Substance and Sexual Activity    Alcohol use: Yes     Comment: daily    Drug use: Never    Sexual activity: Not Currently     Review of Systems   Constitutional:  Negative for activity change, appetite change, chills, fatigue, fever and unexpected weight change.   HENT:  Negative for congestion, ear pain, sore throat and trouble  swallowing.    Eyes:  Negative for pain, redness and itching.   Respiratory:  Negative for cough, shortness of breath and wheezing.    Cardiovascular:  Negative for chest pain, palpitations and leg swelling.   Gastrointestinal:  Positive for anal bleeding and blood in stool. Negative for abdominal distention, abdominal pain, constipation, diarrhea, nausea, rectal pain and vomiting.   Endocrine: Negative for cold intolerance, heat intolerance and polyuria.   Genitourinary:  Negative for dysuria, flank pain, frequency and hematuria.   Musculoskeletal:  Negative for gait problem, joint swelling and neck pain.   Skin:  Negative for color change, rash and wound.   Allergic/Immunologic: Negative for environmental allergies and immunocompromised state.   Neurological:  Negative for dizziness, speech difficulty, weakness and numbness.   Psychiatric/Behavioral:  Negative for agitation, confusion and hallucinations.      Objective:     Vital Signs (Most Recent):  Temp: 98.1 °F (36.7 °C) (06/11/25 1050)  Pulse: (!) 59 (06/11/25 1050)  Resp: 18 (06/11/25 1050)  BP: (!) 158/93 (06/11/25 1050)  SpO2: 99 % (06/11/25 1050) Vital Signs (24h Range):  Temp:  [98.1 °F (36.7 °C)] 98.1 °F (36.7 °C)  Pulse:  [59] 59  Resp:  [18] 18  SpO2:  [99 %] 99 %  BP: (158)/(93) 158/93     Weight: 77.1 kg (170 lb)  Body mass index is 24.39 kg/m².    Physical Exam  Constitutional:       Appearance: He is well-developed.   HENT:      Head: Normocephalic and atraumatic.   Eyes:      Conjunctiva/sclera: Conjunctivae normal.   Neck:      Thyroid: No thyromegaly.   Cardiovascular:      Rate and Rhythm: Bradycardia present.   Pulmonary:      Effort: Pulmonary effort is normal. No respiratory distress.   Abdominal:      General: There is no distension.      Palpations: Abdomen is soft. There is no mass.      Tenderness: There is no abdominal tenderness.   Musculoskeletal:         General: No tenderness. Normal range of motion.      Cervical back: Normal  range of motion.   Skin:     General: Skin is warm and dry.      Capillary Refill: Capillary refill takes less than 2 seconds.      Findings: No rash.   Neurological:      Mental Status: He is alert and oriented to person, place, and time.       Assessment/Plan:     Patient is a 55 y.o. male who presents for colonoscopy and possible banding    - Ok to proceed to endoscopy suite for colonoscopy and possible banding  - Consent obtained. All risks, benefits and alternatives fully explained to patient, including but not limited to bleeding, infection, perforation, anorectal pain, malpositioned bands, and missed polyps. All questions appropriately answered to patient's satisfaction. Consent signed and placed on chart.    There are no hospital problems to display for this patient.    VTE Risk Mitigation (From admission, onward)      None            Dhruv Joseph MD  Colon and Rectal Surgery  [facility] ([facility])